# Patient Record
Sex: FEMALE | Race: ASIAN | NOT HISPANIC OR LATINO | Employment: OTHER | URBAN - METROPOLITAN AREA
[De-identification: names, ages, dates, MRNs, and addresses within clinical notes are randomized per-mention and may not be internally consistent; named-entity substitution may affect disease eponyms.]

---

## 2017-09-06 ENCOUNTER — OFFICE VISIT (OUTPATIENT)
Dept: FAMILY MEDICINE | Facility: CLINIC | Age: 62
End: 2017-09-06
Payer: MEDICAID

## 2017-09-06 VITALS
BODY MASS INDEX: 24.9 KG/M2 | OXYGEN SATURATION: 99 % | TEMPERATURE: 97.7 F | WEIGHT: 126.8 LBS | HEART RATE: 69 BPM | HEIGHT: 60 IN | DIASTOLIC BLOOD PRESSURE: 76 MMHG | SYSTOLIC BLOOD PRESSURE: 132 MMHG

## 2017-09-06 DIAGNOSIS — Z00.00 NORMAL ROUTINE HISTORY AND PHYSICAL EXAMINATION: Primary | ICD-10-CM

## 2017-09-06 DIAGNOSIS — Z13.6 CARDIOVASCULAR SCREENING; LDL GOAL LESS THAN 160: ICD-10-CM

## 2017-09-06 DIAGNOSIS — Z01.00 VISIT FOR EYE AND VISION EXAM: ICD-10-CM

## 2017-09-06 DIAGNOSIS — Z11.3 SCREEN FOR STD (SEXUALLY TRANSMITTED DISEASE): ICD-10-CM

## 2017-09-06 DIAGNOSIS — Z12.31 VISIT FOR SCREENING MAMMOGRAM: ICD-10-CM

## 2017-09-06 DIAGNOSIS — Z12.4 SCREENING FOR MALIGNANT NEOPLASM OF CERVIX: ICD-10-CM

## 2017-09-06 DIAGNOSIS — Z11.59 NEED FOR HEPATITIS C SCREENING TEST: ICD-10-CM

## 2017-09-06 DIAGNOSIS — Z23 NEED FOR PROPHYLACTIC VACCINATION AND INOCULATION AGAINST INFLUENZA: ICD-10-CM

## 2017-09-06 LAB
ALBUMIN SERPL-MCNC: 4 G/DL (ref 3.4–5)
ALP SERPL-CCNC: 80 U/L (ref 40–150)
ALT SERPL W P-5'-P-CCNC: 24 U/L (ref 0–50)
AST SERPL W P-5'-P-CCNC: 18 U/L (ref 0–45)
BILIRUB DIRECT SERPL-MCNC: 0.1 MG/DL (ref 0–0.2)
BILIRUB SERPL-MCNC: 0.6 MG/DL (ref 0.2–1.3)
GLUCOSE SERPL-MCNC: 107 MG/DL (ref 70–99)
LDLC SERPL DIRECT ASSAY-MCNC: 114 MG/DL
PROT SERPL-MCNC: 8.4 G/DL (ref 6.8–8.8)
SPECIMEN SOURCE: NORMAL
WET PREP SPEC: NORMAL

## 2017-09-06 PROCEDURE — 87389 HIV-1 AG W/HIV-1&-2 AB AG IA: CPT | Performed by: NURSE PRACTITIONER

## 2017-09-06 PROCEDURE — 82947 ASSAY GLUCOSE BLOOD QUANT: CPT | Performed by: NURSE PRACTITIONER

## 2017-09-06 PROCEDURE — G0476 HPV COMBO ASSAY CA SCREEN: HCPCS | Performed by: NURSE PRACTITIONER

## 2017-09-06 PROCEDURE — 36415 COLL VENOUS BLD VENIPUNCTURE: CPT | Performed by: NURSE PRACTITIONER

## 2017-09-06 PROCEDURE — T1013 SIGN LANG/ORAL INTERPRETER: HCPCS | Mod: U3 | Performed by: NURSE PRACTITIONER

## 2017-09-06 PROCEDURE — 87591 N.GONORRHOEAE DNA AMP PROB: CPT | Performed by: NURSE PRACTITIONER

## 2017-09-06 PROCEDURE — 80076 HEPATIC FUNCTION PANEL: CPT | Performed by: NURSE PRACTITIONER

## 2017-09-06 PROCEDURE — G0145 SCR C/V CYTO,THINLAYER,RESCR: HCPCS | Performed by: NURSE PRACTITIONER

## 2017-09-06 PROCEDURE — 86780 TREPONEMA PALLIDUM: CPT | Performed by: NURSE PRACTITIONER

## 2017-09-06 PROCEDURE — G0472 HEP C SCREEN HIGH RISK/OTHER: HCPCS | Performed by: NURSE PRACTITIONER

## 2017-09-06 PROCEDURE — 90686 IIV4 VACC NO PRSV 0.5 ML IM: CPT | Performed by: NURSE PRACTITIONER

## 2017-09-06 PROCEDURE — 83721 ASSAY OF BLOOD LIPOPROTEIN: CPT | Performed by: NURSE PRACTITIONER

## 2017-09-06 PROCEDURE — 87210 SMEAR WET MOUNT SALINE/INK: CPT | Performed by: NURSE PRACTITIONER

## 2017-09-06 PROCEDURE — 86706 HEP B SURFACE ANTIBODY: CPT | Performed by: NURSE PRACTITIONER

## 2017-09-06 PROCEDURE — 99396 PREV VISIT EST AGE 40-64: CPT | Mod: 25 | Performed by: NURSE PRACTITIONER

## 2017-09-06 PROCEDURE — 90471 IMMUNIZATION ADMIN: CPT | Performed by: NURSE PRACTITIONER

## 2017-09-06 PROCEDURE — 87491 CHLMYD TRACH DNA AMP PROBE: CPT | Performed by: NURSE PRACTITIONER

## 2017-09-06 NOTE — PATIENT INSTRUCTIONS
Based on your medical history and these are the current health maintenance or preventive care services that you are due for (some may have been done at this visit)  Health Maintenance Due   Topic Date Due     TETANUS IMMUNIZATION (SYSTEM ASSIGNED)  04/02/1974     HEPATITIS C SCREENING  04/02/1974     PAP SCREENING Q3 YR (SYSTEM ASSIGNED)  04/02/1977     LIPID SCREEN Q5 YR FEMALE (SYSTEM ASSIGNED)  04/02/2001     ADVANCE DIRECTIVE PLANNING Q5 YRS  04/02/2011     MAMMO SCREEN Q2 YR (SYSTEM ASSIGNED)  06/01/2015     EYE EXAM Q1 YEAR  07/14/2015     INFLUENZA VACCINE (SYSTEM ASSIGNED)  09/01/2017         At [facility], we strive to deliver an exceptional experience to you, every time we see you.    If you receive a survey in the mail, please send us back your thoughts. We really do value your feedback.    Your care team's suggested websites for health information:  Www.RENTISH.[employer] : Up to date and easily searchable information on multiple topics.  Www.medlineplus.gov : medication info, interactive tutorials, watch real surgeries online  Www.familydoctor.org : good info from the Academy of Family Physicians  Www.cdc.gov : public health info, travel advisories, epidemics (H1N1)  Www.aap.org : children's health info, normal development, vaccinations  Www.health.[place] Health.[place].us : [place] dept of health, public health issues in [place], N1N1    How to contact your care team:   Team Steph/Juventino [phone]         Pharmacy [phone]    Dr. Cárdenas, Mariely Tafoya PA-C, Dr. Burden, Cherise WADR CNP, Fern Vu PA-C, Dr. Wahl, and SYLVIA Gomes CNP    Team RNs: Dariela & Akiko      Clinic hours  M-Th 7 am-7 pm   Fri 7 am-5 pm.   Urgent care M-F 11 am-9 pm,   Sat/Sun 9 am-5 pm.  Pharmacy M-Th 8 am-8 pm Fri 8 am-6 pm  Sat/Sun 9 am-5 pm.     All password changes, disabled accounts, or ID changes in [employer]/MyHealth will be done by our Access Services Department.    If you need help with  your account or password, call: [phone]. Clinic staff no longer has the ability to change passwords.     Preventive Health Recommendations  Female Ages 50 - 64    Yearly exam: See your health care provider every year in order to  o Review health changes.   o Discuss preventive care.    o Review your medicines if your doctor has prescribed any.      Get a Pap test every three years (unless you have an abnormal result and your provider advises testing more often).    If you get Pap tests with HPV test, you only need to test every 5 years, unless you have an abnormal result.     You do not need a Pap test if your uterus was removed (hysterectomy) and you have not had cancer.    You should be tested each year for STDs (sexually transmitted diseases) if you're at risk.     Have a mammogram every 1 to 2 years.    Have a colonoscopy at age 50, or have a yearly FIT test (stool test). These exams screen for colon cancer.      Have a cholesterol test every 5 years, or more often if advised.    Have a diabetes test (fasting glucose) every three years. If you are at risk for diabetes, you should have this test more often.     If you are at risk for osteoporosis (brittle bone disease), think about having a bone density scan (DEXA).    Shots: Get a flu shot each year. Get a tetanus shot every 10 years.    Nutrition:     Eat at least 5 servings of fruits and vegetables each day.    Eat whole-grain bread, whole-wheat pasta and brown rice instead of white grains and rice.    Talk to your provider about Calcium and Vitamin D.     Lifestyle    Exercise at least 150 minutes a week (30 minutes a day, 5 days a week). This will help you control your weight and prevent disease.    Limit alcohol to one drink per day.    No smoking.     Wear sunscreen to prevent skin cancer.     See your dentist every six months for an exam and cleaning.    See your eye doctor every 1 to 2 years.

## 2017-09-06 NOTE — LETTER
September 8, 2017      Mahad Milligan  [address]  [place] [address]            Dear Mahad,    Your STD testing is negative.  Continue to use condoms to help prevent sexually transmitted diseases.  Feel free to contact me with any questions or concerns.      Your blood sugar was slightly elevated at 107 but you were not fasting. Feel free to contact me with any questions or concerns.  Thank you for allowing me to participate in your care.    Heather Olsen APRN, CNP/ak        Results for orders placed or performed in visit on 09/06/17   Hepatitis C Screen Reflex to HCV RNA Quant and Genotype   Result Value Ref Range    Hepatitis C Antibody Nonreactive NR^Nonreactive   Pap imaged thin layer screen with HPV - recommended age 30 - 65 years (select HPV order below)   Result Value Ref Range    PAP NIL     Copath Report         Patient Name: MAHAD MILLIGAN  MR#: [id number]  Specimen #: [id number]  Collected: 9/6/2017  Received: 9/7/2017  Reported: 9/8/2017 14:45  Ordering Phy(s): HEATHER OLSEN    For improved result formatting, select 'View Enhanced Report Format'  under Linked Documents section.    SPECIMEN/STAIN PROCESS:  Pap imaged thin layer prep screening (Surepath, FocalPoint with guided  screening)       Pap-Cyto x 1, HPV ordered x 1    SOURCE: Cervical, endocervical  ----------------------------------------------------------------   Pap imaged thin layer prep screening (Surepath, FocalPoint with guided  screening)  SPECIMEN ADEQUACY:  Satisfactory for evaluation.  -Transitional zone component could not be determined due to atrophy.    CYTOLOGIC INTERPRETATION:    Negative for intraepithelial lesion or malignancy         Other Non-Neoplastic Findings:  -Inflammation present.    Electronically signed out by:  ROB Duggan (ASCP)    Processed and screened at [facility]    CLINICAL HISTORY:    Post Menopausal,    Papanicolaou Test  Limitations:  Cervical cytology is a screening test  with limited sensitivity; regular screening is critical for cancer  prevention; Pap tests are primarily effective for the  diagnosis/prevention of squamous cell carcinoma, not adenocarcinomas or  other cancers.    TESTING LAB LOCATION:  [address]  [phone]    COLLECTION SITE:  Client:  [facility]  Location: BK (B)     GLUCOSE   Result Value Ref Range    Glucose 107 (H) 70 - 99 mg/dL   LDL cholesterol direct   Result Value Ref Range    LDL Cholesterol Direct 114 (H) <100 mg/dL   Hepatic panel   Result Value Ref Range    Bilirubin Direct 0.1 0.0 - 0.2 mg/dL    Bilirubin Total 0.6 0.2 - 1.3 mg/dL    Albumin 4.0 3.4 - 5.0 g/dL    Protein Total 8.4 6.8 - 8.8 g/dL    Alkaline Phosphatase 80 40 - 150 U/L    ALT 24 0 - 50 U/L    AST 18 0 - 45 U/L   HIV Antigen Antibody Combo   Result Value Ref Range    HIV Antigen Antibody Combo Nonreactive NR^Nonreactive       Anti Treponema   Result Value Ref Range    Treponema pallidum Antibody Negative NEG^Negative   Hepatitis B Surface Antibody   Result Value Ref Range    Hepatitis B Surface Antibody 11.52 (H) <8.00 m[IU]/mL   Chlamydia trachomatis PCR   Result Value Ref Range    Specimen Description Cervix     Chlamydia Trachomatis PCR Negative NEG^Negative   Neisseria gonorrhoeae PCR   Result Value Ref Range    Specimen Descrip Cervix     N Gonorrhea PCR Negative NEG^Negative   Wet prep   Result Value Ref Range    Specimen Description Vagina     Wet Prep No yeast seen     Wet Prep No clue cells seen     Wet Prep No Trichomonas seen

## 2017-09-06 NOTE — PROGRESS NOTES
SUBJECTIVE:   CC: Mahad Jay is an 62 year old woman who presents for preventive health visit.     Healthy Habits:    Do you get at least three servings of calcium containing foods daily (dairy, green leafy vegetables, etc.)? yes    Amount of exercise or daily activities, outside of work: 2-3 day(s) per week    Problems taking medications regularly No    Medication side effects: No    Have you had an eye exam in the past two years? no    Do you see a dentist twice per year? no    Do you have sleep apnea, excessive snoring or daytime drowsiness?no          -------------------------------------    Today's PHQ-2 Score:   PHQ-2 ( 1999 Pfizer) 9/6/2017 5/8/2014   Q1: Little interest or pleasure in doing things 0 0   Q2: Feeling down, depressed or hopeless 0 0   PHQ-2 Score 0 0       Abuse: Current or Past(Physical, Sexual or Emotional)- No  Do you feel safe in your environment - Yes  Social History   Substance Use Topics     Smoking status: Never Smoker     Smokeless tobacco: Never Used     Alcohol use No     The patient does not drink >3 drinks per day nor >7 drinks per week.    Reviewed orders with patient.  Reviewed health maintenance and updated orders accordingly - Yes  Labs reviewed in EPIC  BP Readings from Last 3 Encounters:   09/06/17 132/76   05/08/14 124/77   05/01/14 142/76    Wt Readings from Last 3 Encounters:   09/06/17 126 lb 12.8 oz (57.5 kg)   05/08/14 123 lb 2 oz (55.8 kg)   05/01/14 122 lb 6 oz (55.5 kg)                  Patient Active Problem List   Diagnosis     Sensorineural hearing loss, asymmetrical     Tinnitus     CARDIOVASCULAR SCREENING; LDL GOAL LESS THAN 160     Esophageal reflux (GERD)     History reviewed. No pertinent surgical history.    Social History   Substance Use Topics     Smoking status: Never Smoker     Smokeless tobacco: Never Used     Alcohol use No     Family History   Problem Relation Age of Onset     CANCER No family hx of      DIABETES No family hx of       "Hypertension No family hx of      CEREBROVASCULAR DISEASE No family hx of      Thyroid Disease No family hx of      Glaucoma No family hx of      Macular Degeneration No family hx of                Patient over age 50, mutual decision to screen reflected in health maintenance.      Pertinent mammograms are reviewed under the imaging tab.  History of abnormal Pap smear: NO - age 30- 65 PAP every 3 years recommended    Reviewed and updated as needed this visit by clinical staffTobacco  Allergies  Meds  Med Hx  Surg Hx  Fam Hx  Soc Hx        Reviewed and updated as needed this visit by Provider        History reviewed. No pertinent past medical history.   History reviewed. No pertinent surgical history.    ROS:  C: NEGATIVE for fever, chills, change in weight  I: NEGATIVE for worrisome rashes, moles or lesions  E: NEGATIVE for vision changes or irritation  ENT: NEGATIVE for ear, mouth and throat problems  R: NEGATIVE for significant cough or SOB  B: NEGATIVE for masses, tenderness or discharge  CV: NEGATIVE for chest pain, palpitations or peripheral edema  GI: NEGATIVE for nausea, abdominal pain, heartburn, or change in bowel habits  : NEGATIVE for unusual urinary or vaginal symptoms. No vaginal bleeding.  M: NEGATIVE for significant arthralgias or myalgia  N: NEGATIVE for weakness, dizziness or paresthesias  E: NEGATIVE for temperature intolerance, skin/hair changes  P: NEGATIVE for changes in mood or affect     OBJECTIVE:   /76  Pulse 69  Temp 97.7  F (36.5  C) (Oral)  Ht 4' 11.53\" (1.512 m)  Wt 126 lb 12.8 oz (57.5 kg)  SpO2 99%  BMI 25.16 kg/m2  EXAM:  GENERAL: healthy, alert and no distress  EYES: Eyes grossly normal to inspection, PERRL and conjunctivae and sclerae normal  HENT: ear canals and TM's normal, nose and mouth without ulcers or lesions  NECK: no adenopathy, no asymmetry, masses, or scars and thyroid normal to palpation  RESP: lungs clear to auscultation - no rales, rhonchi or " wheezes  BREAST: normal without masses, tenderness or nipple discharge and no palpable axillary masses or adenopathy  CV: regular rate and rhythm, normal S1 S2, no S3 or S4, no murmur, click or rub, no peripheral edema and peripheral pulses strong  ABDOMEN: soft, nontender, no hepatosplenomegaly, no masses and bowel sounds normal   (female): normal female external genitalia, normal urethral meatus, vaginal mucosa pink, moist, well rugated, and normal cervix/adnexa/uterus without masses or discharge  RECTAL: normal sphincter tone, no rectal masses  MS: no gross musculoskeletal defects noted, no edema  SKIN: no suspicious lesions or rashes  NEURO: Normal strength and tone, mentation intact and speech normal  PSYCH: mentation appears normal, affect normal/bright  LYMPH: no cervical, supraclavicular, axillary, or inguinal adenopathy    ASSESSMENT/PLAN:   1. Normal routine history and physical examination    - GLUCOSE    2. Screening for malignant neoplasm of cervix    - Pap imaged thin layer screen with HPV - recommended age 30 - 65 years (select HPV order below)    3. CARDIOVASCULAR SCREENING; LDL GOAL LESS THAN 160    - LDL cholesterol direct    4. Visit for screening mammogram    - MA SCREENING DIGITAL BILAT - Future  (s+30); Future    5. Need for hepatitis C screening test    - Hepatitis C Screen Reflex to HCV RNA Quant and Genotype    6. Screen for STD (sexually transmitted disease)    - Chlamydia trachomatis PCR  - Neisseria gonorrhoeae PCR  - Wet prep  - Hepatic panel  - HIV Antigen Antibody Combo  - Anti Treponema  - Hepatitis B Surface Antibody    7. Need for prophylactic vaccination and inoculation against influenza        COUNSELING:   Reviewed preventive health counseling, as reflected in patient instructions       Regular exercise       Healthy diet/nutrition       Vision screening       Osteoporosis Prevention/Bone Health       Safe sex practices/STD prevention       Consider Hep C screening for patients  "born between 1944 and 1965      BP Screening:   Last 3 BP Readings:    BP Readings from Last 3 Encounters:   09/06/17 132/76   05/08/14 124/77   05/01/14 142/76       The following was recommended to the patient:  Re-screen BP within a year and recommended lifestyle modifications   reports that she has never smoked. She has never used smokeless tobacco.    Estimated body mass index is 25.16 kg/(m^2) as calculated from the following:    Height as of this encounter: 4' 11.53\" (1.512 m).    Weight as of this encounter: 126 lb 12.8 oz (57.5 kg).   Weight management plan: Discussed healthy diet and exercise guidelines and patient will follow up in 12 months in clinic to re-evaluate.    Counseling Resources:  ATP IV Guidelines  Pooled Cohorts Equation Calculator  Breast Cancer Risk Calculator  FRAX Risk Assessment  ICSI Preventive Guidelines  Dietary Guidelines for Americans, 2010  USDA's MyPlate  ASA Prophylaxis  Lung CA Screening    SYLVIA Brown CNP  [facility]  "

## 2017-09-06 NOTE — NURSING NOTE
"Chief Complaint   Patient presents with     Physical     Not fasting     Flu Shot       Initial /82 (BP Location: Left arm, Patient Position: Sitting, Cuff Size: Adult Regular)  Pulse 69  Temp 97.7  F (36.5  C) (Oral)  Ht 4' 11.53\" (1.512 m)  Wt 126 lb 12.8 oz (57.5 kg)  SpO2 99%  BMI 25.16 kg/m2 Estimated body mass index is 25.16 kg/(m^2) as calculated from the following:    Height as of this encounter: 4' 11.53\" (1.512 m).    Weight as of this encounter: 126 lb 12.8 oz (57.5 kg).  Medication Reconciliation: complete   Coco Marin MA      "

## 2017-09-06 NOTE — LETTER
September 14, 2017    Mahad Jay  [address]  [place] [address]    Dear Mahad,  We are happy to inform you that your PAP smear result from 9/6/17 is normal.  We are now able to do a follow up test on PAP smears. The DNA test is for HPV (Human Papilloma Virus). Cervical cancer is closely linked with certain types of HPV. Your result showed no evidence of high risk HPV.  Therefore we recommend you return in 3 years for your next pap smear.  You will still need to return to the clinic every year for an annual exam and other preventive tests.  Please contact the clinic at [phone] with any questions.  Sincerely,    SYLVIA Brown CNP/rlm

## 2017-09-06 NOTE — MR AVS SNAPSHOT
After Visit Summary   9/6/2017    Mahad Jay    MRN: [id number]           Patient Information     Date Of Birth          1955        Visit Information        Provider Department      9/6/2017 11:30 AM Lilia Olsen APRN CNP; [place] [employer] [facility]        Today's Diagnoses     Normal routine history and physical examination    -  1    Screening for malignant neoplasm of cervix        CARDIOVASCULAR SCREENING; LDL GOAL LESS THAN 160        Visit for screening mammogram        Need for hepatitis C screening test        Screen for STD (sexually transmitted disease)        Need for prophylactic vaccination and inoculation against influenza          Care Instructions    Based on your medical history and these are the current health maintenance or preventive care services that you are due for (some may have been done at this visit)  Health Maintenance Due   Topic Date Due     TETANUS IMMUNIZATION (SYSTEM ASSIGNED)  04/02/1974     HEPATITIS C SCREENING  04/02/1974     PAP SCREENING Q3 YR (SYSTEM ASSIGNED)  04/02/1977     LIPID SCREEN Q5 YR FEMALE (SYSTEM ASSIGNED)  04/02/2001     ADVANCE DIRECTIVE PLANNING Q5 YRS  04/02/2011     MAMMO SCREEN Q2 YR (SYSTEM ASSIGNED)  06/01/2015     EYE EXAM Q1 YEAR  07/14/2015     INFLUENZA VACCINE (SYSTEM ASSIGNED)  09/01/2017         At [facility], we strive to deliver an exceptional experience to you, every time we see you.    If you receive a survey in the mail, please send us back your thoughts. We really do value your feedback.    Your care team's suggested websites for health information:  Www.MobiliBuy.org : Up to date and easily searchable information on multiple topics.  Www.medlineplus.gov : medication info, interactive tutorials, watch real surgeries online  Www.familydoctor.org : good info from the Academy of Family Physicians  Www.cdc.gov : public health info, travel advisories, epidemics  (H1N1)  Www.aap.org : children's health info, normal development, vaccinations  Www.health.[facility].[place].us : [place] dept of health, public health issues in [place], N1N1    How to contact your care team:   Christina Choi/Juventino [phone]         Pharmacy [phone]    Dr. Cárdenas, Mariely Tafoya PA-C, Dr. Burden, Cherise Bazan APRN CNP, Fern Vu PA-C, Dr. Wahl, and SYLVIA Gomes CNP    Team RNs: Dariela & Akiko      Clinic hours  M-Th 7 am-7 pm   Fri 7 am-5 pm.   Urgent care M-F 11 am-9 pm,   Sat/Sun 9 am-5 pm.  Pharmacy M-Th 8 am-8 pm Fri 8 am-6 pm  Sat/Sun 9 am-5 pm.     All password changes, disabled accounts, or ID changes in [employer]/MyHealth will be done by our Access Services Department.    If you need help with your account or password, call: [phone]. Clinic staff no longer has the ability to change passwords.     Preventive Health Recommendations  Female Ages 50 - 64    Yearly exam: See your health care provider every year in order to  o Review health changes.   o Discuss preventive care.    o Review your medicines if your doctor has prescribed any.      Get a Pap test every three years (unless you have an abnormal result and your provider advises testing more often).    If you get Pap tests with HPV test, you only need to test every 5 years, unless you have an abnormal result.     You do not need a Pap test if your uterus was removed (hysterectomy) and you have not had cancer.    You should be tested each year for STDs (sexually transmitted diseases) if you're at risk.     Have a mammogram every 1 to 2 years.    Have a colonoscopy at age 50, or have a yearly FIT test (stool test). These exams screen for colon cancer.      Have a cholesterol test every 5 years, or more often if advised.    Have a diabetes test (fasting glucose) every three years. If you are at risk for diabetes, you should have this test more often.     If you are at risk for osteoporosis (brittle bone disease), think about  having a bone density scan (DEXA).    Shots: Get a flu shot each year. Get a tetanus shot every 10 years.    Nutrition:     Eat at least 5 servings of fruits and vegetables each day.    Eat whole-grain bread, whole-wheat pasta and brown rice instead of white grains and rice.    Talk to your provider about Calcium and Vitamin D.     Lifestyle    Exercise at least 150 minutes a week (30 minutes a day, 5 days a week). This will help you control your weight and prevent disease.    Limit alcohol to one drink per day.    No smoking.     Wear sunscreen to prevent skin cancer.     See your dentist every six months for an exam and cleaning.    See your eye doctor every 1 to 2 years.            Follow-ups after your visit        Your next 10 appointments already scheduled     Sep 15, 2017  4:00 PM CDT   MA SCREENING DIGITAL BILATERAL with [id number]   [facility]    [address] [place] [id number] [phone]           Do not use any powder, lotion or deodorant under your arms or on your breast. If you do, we will ask you to remove it before your exam.  Wear comfortable, two-piece clothing.  If you have any allergies, tell your care team.  Bring any previous mammograms from other facilities or have them mailed to the breast center.              Future tests that were ordered for you today     Open Future Orders        Priority Expected Expires Ordered    MA SCREENING DIGITAL BILAT - Future  (s+30) Routine  9/6/2018 9/6/2017            Who to contact     If you have questions or need follow up information about today's clinic visit or your schedule please contact [facility] directly at [phone].  Normal or non-critical lab and imaging results will be communicated to you by MyChart, letter or phone within 4 business days after the clinic has received the results. If you do not hear from us within 7 days, please contact the clinic  "through Honestly Now or phone. If you have a critical or abnormal lab result, we will notify you by phone as soon as possible.  Submit refill requests through Honestly Now or call your pharmacy and they will forward the refill request to us. Please allow 3 business days for your refill to be completed.          Additional Information About Your Visit        OpenROVharmyParcelDelivery Information     Honestly Now lets you send messages to your doctor, view your test results, renew your prescriptions, schedule appointments and more. To sign up, go to [email or web address] . Click on \"Log in\" on the left side of the screen, which will take you to the Welcome page. Then click on \"Sign up Now\" on the right side of the page.     You will be asked to enter the access code listed below, as well as some personal information. Please follow the directions to create your username and password.     Your access code is: [email or web address]  Expires: 2017 12:43 PM     Your access code will  in 90 days. If you need help or a new code, please call your [place] clinic or [phone].        Care EveryWhere ID     This is your Care EveryWhere ID. This could be used by other organizations to access your [place] medical records  [id number]        Your Vitals Were     Pulse Temperature Height Pulse Oximetry BMI (Body Mass Index)       69 97.7  F (36.5  C) (Oral) 4' 11.53\" (1.512 m) 99% 25.16 kg/m2        Blood Pressure from Last 3 Encounters:   17 132/76   14 124/77   14 142/76    Weight from Last 3 Encounters:   17 126 lb 12.8 oz (57.5 kg)   14 123 lb 2 oz (55.8 kg)   14 122 lb 6 oz (55.5 kg)              We Performed the Following     Anti Treponema     Chlamydia trachomatis PCR     GLUCOSE     Hepatic panel     Hepatitis B Surface Antibody     Hepatitis C Screen Reflex to HCV RNA Quant and Genotype     HIV Antigen Antibody Combo     LDL cholesterol direct     Neisseria gonorrhoeae PCR     Pap imaged thin layer screen with " HPV - recommended age 30 - 65 years (select HPV order below)     Wet prep        Primary Care Provider    None Specified       No primary provider on file.        Equal Access to Services     SARAH LOZANO : Hadii dane Matthews, saritha milligandesmondha, westontamiko loredorereelaine elainerachelelaine, clint vahein hayaaashly bowsercelena rambosheelaindio barton. So [facility] [place] [facility] [phone].    ATENCIÓN: Si habla español, tiene a lynn disposición servicios gratuitos de asistencia lingüística. Llame al [phone].    We comply with applicable federal civil rights laws and [place] laws. We do not discriminate on the basis of race, color, national origin, age, disability sex, sexual orientation or gender identity.            Thank you!     Thank you for choosing [facility]  for your care. Our goal is always to provide you with excellent care. Hearing back from our patients is one way we can continue to improve our services. Please take a few minutes to complete the written survey that you may receive in the mail after your visit with us. Thank you!             Your Updated Medication List - Protect others around you: Learn how to safely use, store and throw away your medicines at www.disposemymeds.org.          This list is accurate as of: 9/6/17 12:43 PM.  Always use your most recent med list.                   Brand Name Dispense Instructions for use Diagnosis    omeprazole 20 MG tablet     30 tablet    Take 1 tablet (20 mg) by mouth daily Take 30-60 minutes before a meal.    Esophageal reflux

## 2017-09-06 NOTE — PROGRESS NOTES
Injectable Influenza Immunization Documentation    1.  Is the person to be vaccinated sick today?  No    2. Does the person to be vaccinated have an allergy to eggs or to a component of the vaccine?  No    3. Has the person to be vaccinated today ever had a serious reaction to influenza vaccine in the past?  No    4. Has the person to be vaccinated ever had Guillain-[place] syndrome?  No     Form completed by EDMUNDO Manzo

## 2017-09-07 LAB
HBV SURFACE AB SERPL IA-ACNC: 11.52 M[IU]/ML
HCV AB SERPL QL IA: NONREACTIVE
HIV 1+2 AB+HIV1 P24 AG SERPL QL IA: NONREACTIVE
T PALLIDUM IGG+IGM SER QL: NEGATIVE

## 2017-09-08 LAB
C TRACH DNA SPEC QL NAA+PROBE: NEGATIVE
COPATH REPORT: NORMAL
N GONORRHOEA DNA SPEC QL NAA+PROBE: NEGATIVE
PAP: NORMAL
SPECIMEN SOURCE: NORMAL
SPECIMEN SOURCE: NORMAL

## 2017-09-11 LAB
FINAL DIAGNOSIS: NORMAL
HPV HR 12 DNA CVX QL NAA+PROBE: NEGATIVE
HPV16 DNA SPEC QL NAA+PROBE: NEGATIVE
HPV18 DNA SPEC QL NAA+PROBE: NEGATIVE
SPECIMEN DESCRIPTION: NORMAL

## 2017-10-03 ENCOUNTER — APPOINTMENT (OUTPATIENT)
Dept: OPTOMETRY | Facility: CLINIC | Age: 62
End: 2017-10-03
Payer: COMMERCIAL

## 2017-10-03 ENCOUNTER — OFFICE VISIT (OUTPATIENT)
Dept: OPTOMETRY | Facility: CLINIC | Age: 62
End: 2017-10-03
Payer: COMMERCIAL

## 2017-10-03 DIAGNOSIS — H52.01 HYPERMETROPIA OF RIGHT EYE: ICD-10-CM

## 2017-10-03 DIAGNOSIS — H10.13 ALLERGIC CONJUNCTIVITIS OF BOTH EYES: ICD-10-CM

## 2017-10-03 DIAGNOSIS — H25.13 NUCLEAR SCLEROSIS OF BOTH EYES: ICD-10-CM

## 2017-10-03 DIAGNOSIS — H52.4 PRESBYOPIA: Primary | ICD-10-CM

## 2017-10-03 PROCEDURE — 92004 COMPRE OPH EXAM NEW PT 1/>: CPT | Performed by: OPTOMETRIST

## 2017-10-03 PROCEDURE — 92340 FIT SPECTACLES MONOFOCAL: CPT | Performed by: OPTOMETRIST

## 2017-10-03 PROCEDURE — 92015 DETERMINE REFRACTIVE STATE: CPT | Performed by: OPTOMETRIST

## 2017-10-03 PROCEDURE — T1013 SIGN LANG/ORAL INTERPRETER: HCPCS | Mod: U3 | Performed by: OPTOMETRIST

## 2017-10-03 RX ORDER — AZELASTINE HYDROCHLORIDE 0.5 MG/ML
1 SOLUTION/ DROPS OPHTHALMIC 2 TIMES DAILY PRN
Qty: 1 BOTTLE | Refills: 6 | Status: SHIPPED | OUTPATIENT
Start: 2017-10-03 | End: 2019-07-17

## 2017-10-03 ASSESSMENT — VISUAL ACUITY
OD_SC: 20/120
OS_SC+: -2
OS_SC: 20/20
OD_SC: 20/30
OS_SC: 20/80-1
METHOD: SNELLEN - LINEAR

## 2017-10-03 ASSESSMENT — REFRACTION_MANIFEST
OS_SPHERE: PLANO
OS_CYLINDER: SPHERE
OD_ADD: +2.50
OD_CYLINDER: SPHERE
OS_ADD: +2.50
OD_SPHERE: +0.50

## 2017-10-03 ASSESSMENT — REFRACTION_WEARINGRX
OS_SPHERE: PLANO
OD_ADD: +2.25
OD_SPHERE: PLANO
OS_ADD: +2.25
SPECS_TYPE: BROKEN X 1 YEAR

## 2017-10-03 ASSESSMENT — CONF VISUAL FIELD
OS_NORMAL: 1
OD_NORMAL: 1

## 2017-10-03 ASSESSMENT — TONOMETRY
IOP_METHOD: TONOPEN
OS_IOP_MMHG: 14
OD_IOP_MMHG: 15

## 2017-10-03 ASSESSMENT — SLIT LAMP EXAM - LIDS
COMMENTS: 1+ DERMATOCHALASIS - UPPER LID
COMMENTS: 1+ DERMATOCHALASIS - UPPER LID

## 2017-10-03 ASSESSMENT — EXTERNAL EXAM - LEFT EYE: OS_EXAM: NORMAL

## 2017-10-03 ASSESSMENT — EXTERNAL EXAM - RIGHT EYE: OD_EXAM: NORMAL

## 2017-10-03 NOTE — PATIENT INSTRUCTIONS
Recommend new glasses.  Your options are a bifocal which would allow you to see distance and near vision or separate glasses for reading.    Optivar- 1 drop both eyes 2 x day as needed for itchy eyes.    You have the start of mild cataracts.  You may notice some blurred vision or glare with night driving.  It is important that you wear good sunglasses to protect your eyes from the ultraviolet light from the sun.  I recommend that you return in 1 year for an eye exam unless there are any sudden changes in your vision.    Return in 1 year for a complete eye exam or sooner if needed.    Boyd Gill, GERMAN    The affects of the dilating drops last for 4- 6 hours.  You will be more sensitive to light and vision will be blurry up close.  Mydriatic sunglasses were given if needed.      Optometry Providers       Clinic Locations                                 Telephone Number   Dr. Jessy Gill   [place]   [place]    [facility] [place]  [phone]     [place] [facility] Hours:                [facility] [facility] Hours:       [facility] Hours:  [address]   [address]      [address] [place] [address]   [place] [address]    [place] [address]  Phone: [phone]                    Phone [phone]                      Phone: [phone]                          Monday 8:00-7:00                          Monday 8:00-7:00                          Monday 8:00-7:00              Tuesday 8:00-6:00                          Tuesday 8:00-7:00                          Tuesday 8:00-7:00              Wednesday 8:00-6:00                  Wednesday 8:00-7:00                   Wednesday 8:00-7:00      Thursday 8:00-6:00                        Thursday 8:00-7:00                         Thursday 8:00-7:00            Friday 8:00-5:00                              Friday 8:00-5:00                              Friday  8:00-5:00    Please log on to [email or web address] to order your contact lenses.  The link is found on the Eye Care and Vision Services page.  As always, Thank you for trusting us with your health care needs!

## 2017-10-03 NOTE — PROGRESS NOTES
Chief Complaint   Patient presents with     COMPREHENSIVE EYE EXAM      Accompanied by   Last Eye Exam: 7-  Dilated Previously: Yes    What are you currently using to see?  Glasses broken x 1 year       Distance Vision Acuity: Satisfied with vision    Near Vision Acuity: Not satisfied     Eye Comfort: itchy when gardening  Do you use eye drops? : Yes: otc [employer] or [employer]  Occupation or Hobbies: none    Estefanía Bear Optometric Assistant, A.B.O.C.          Medical, surgical and family histories reviewed and updated 10/3/2017.       OBJECTIVE: See Ophthalmology exam    ASSESSMENT:    ICD-10-CM    1. Presbyopia H52.4 REFRACTION   2. Hypermetropia of right eye H52.01 REFRACTION   3. Allergic conjunctivitis of both eyes H10.13 azelastine (OPTIVAR) 0.05 % SOLN ophthalmic solution     EYE EXAM (SIMPLE-NONBILLABLE)   4. Nuclear sclerosis of both eyes H25.13 EYE EXAM (SIMPLE-NONBILLABLE)      PLAN:     Patient Instructions   Recommend new glasses.  Your options are a bifocal which would allow you to see distance and near vision or separate glasses for reading.    Optivar- 1 drop both eyes 2 x day as needed for itchy eyes.    You have the start of mild cataracts.  You may notice some blurred vision or glare with night driving.  It is important that you wear good sunglasses to protect your eyes from the ultraviolet light from the sun.  I recommend that you return in 1 year for an eye exam unless there are any sudden changes in your vision.    Return in 1 year for a complete eye exam or sooner if needed.    Boyd Gill, OD

## 2017-10-03 NOTE — MR AVS SNAPSHOT
After Visit Summary   10/3/2017    Mahad Jay    MRN: [id number]           Patient Information     Date Of Birth          1955        Visit Information        Provider Department      10/3/2017 9:30 AM Boyd Gill, OD; [employer] [facility]        Today's Diagnoses     Presbyopia    -  1    Hypermetropia of right eye        Allergic conjunctivitis of both eyes        Nuclear sclerosis of both eyes          Care Instructions    Recommend new glasses.  Your options are a bifocal which would allow you to see distance and near vision or separate glasses for reading.    Optivar- 1 drop both eyes 2 x day as needed for itchy eyes.    You have the start of mild cataracts.  You may notice some blurred vision or glare with night driving.  It is important that you wear good sunglasses to protect your eyes from the ultraviolet light from the sun.  I recommend that you return in 1 year for an eye exam unless there are any sudden changes in your vision.    Return in 1 year for a complete eye exam or sooner if needed.    Boyd Gill, OD    The affects of the dilating drops last for 4- 6 hours.  You will be more sensitive to light and vision will be blurry up close.  Mydriatic sunglasses were given if needed.      Optometry Providers       Clinic Locations                                 Telephone Number   Dr. Jessy Gill   [facility]  [phone]     [place] [facility] Hours:                [place] [facility] Hours:       [place] [facility] Hours:  [address]   [address]     [address] [place] [address]   [place] [address]    [place] [address]  Phone: [phone]                    Phone [phone]                      Phone: [phone]                          Monday 8:00-7:00                          Monday 8:00-7:00          "                 Monday 8:00-7:00              Tuesday 8:00-6:00                          Tuesday 8:00-7:00                          Tuesday 8:00-7:00              Wednesday 8:00-6:00                  Wednesday 8:00-7:00                   Wednesday 8:00-7:00      Thursday 8:00-6:00                        Thursday 8:00-7:00                         Thursday 8:00-7:00            Friday 8:00-5:00                              Friday 8:00-5:00                              Friday 8:00-5:00    Please log on to [email or web address] to order your contact lenses.  The link is found on the Eye Care and Vision Services page.  As always, Thank you for trusting us with your health care needs!                Follow-ups after your visit        Follow-up notes from your care team     Return in about 1 year (around 10/3/2018) for Annual Visit.      Who to contact     If you have questions or need follow up information about today's clinic visit or your schedule please contact [facility] directly at [phone].  Normal or non-critical lab and imaging results will be communicated to you by Carmudihart, letter or phone within 4 business days after the clinic has received the results. If you do not hear from us within 7 days, please contact the clinic through Carmudihart or phone. If you have a critical or abnormal lab result, we will notify you by phone as soon as possible.  Submit refill requests through SocialPandas or call your pharmacy and they will forward the refill request to us. Please allow 3 business days for your refill to be completed.          Additional Information About Your Visit        SocialPandas Information     SocialPandas lets you send messages to your doctor, view your test results, renew your prescriptions, schedule appointments and more. To sign up, go to [email or web address] . Click on \"Log in\" on the left side of the screen, which will take you to the Welcome page. Then click on \"Sign up Now\" on the right side of " the page.     You will be asked to enter the access code listed below, as well as some personal information. Please follow the directions to create your username and password.     Your access code is: [email or web address]  Expires: 2017 12:43 PM     Your access code will  in 90 days. If you need help or a new code, please call your [place] clinic or [phone].        Care EveryWhere ID     This is your Care EveryWhere ID. This could be used by other organizations to access your [place] medical records  [id number]         Blood Pressure from Last 3 Encounters:   17 132/76   14 124/77   14 142/76    Weight from Last 3 Encounters:   17 57.5 kg (126 lb 12.8 oz)   14 55.8 kg (123 lb 2 oz)   14 55.5 kg (122 lb 6 oz)              We Performed the Following     EYE EXAM (SIMPLE-NONBILLABLE)     REFRACTION          Today's Medication Changes          These changes are accurate as of: 10/3/17 11:16 AM.  If you have any questions, ask your nurse or doctor.               Start taking these medicines.        Dose/Directions    azelastine 0.05 % Soln ophthalmic solution   Commonly known as:  OPTIVAR   Used for:  Allergic conjunctivitis of both eyes   Started by:  Boyd Gill, OD        Dose:  1 drop   Place 1 drop into both eyes 2 times daily as needed   Quantity:  1 Bottle   Refills:  6            Where to get your medicines      [place] [facility] [place] [address]  [address], [facility] [address]     Phone:  [phone]     azelastine 0.05 % Soln ophthalmic solution                Primary Care Provider    None Specified       No primary provider on file.        Equal Access to Services     SARAH LOZANO : Parker Matthews, saritha vivar, qabienvenido kaalmaelaine lopez, clint barton. So [facility] [phone].    ATENCIÓN: Si habla español, tiene a lynn disposición servicios  clare de asistencia lingüística. Joey frye [phone].    We comply with applicable federal civil rights laws and [place] laws. We do not discriminate on the basis of race, color, national origin, age, disability, sex, sexual orientation, or gender identity.            Thank you!     Thank you for choosing [facility]  for your care. Our goal is always to provide you with excellent care. Hearing back from our patients is one way we can continue to improve our services. Please take a few minutes to complete the written survey that you may receive in the mail after your visit with us. Thank you!             Your Updated Medication List - Protect others around you: Learn how to safely use, store and throw away your medicines at www.disposemymeds.org.          This list is accurate as of: 10/3/17 11:16 AM.  Always use your most recent med list.                   Brand Name Dispense Instructions for use Diagnosis    azelastine 0.05 % Soln ophthalmic solution    OPTIVAR    1 Bottle    Place 1 drop into both eyes 2 times daily as needed    Allergic conjunctivitis of both eyes

## 2019-07-17 ENCOUNTER — ANCILLARY PROCEDURE (OUTPATIENT)
Dept: GENERAL RADIOLOGY | Facility: CLINIC | Age: 64
End: 2019-07-17
Attending: NURSE PRACTITIONER
Payer: COMMERCIAL

## 2019-07-17 ENCOUNTER — OFFICE VISIT (OUTPATIENT)
Dept: FAMILY MEDICINE | Facility: CLINIC | Age: 64
End: 2019-07-17
Payer: COMMERCIAL

## 2019-07-17 VITALS
RESPIRATION RATE: 12 BRPM | HEART RATE: 72 BPM | HEIGHT: 59 IN | DIASTOLIC BLOOD PRESSURE: 84 MMHG | BODY MASS INDEX: 26.21 KG/M2 | TEMPERATURE: 98 F | WEIGHT: 130 LBS | SYSTOLIC BLOOD PRESSURE: 129 MMHG | OXYGEN SATURATION: 96 %

## 2019-07-17 DIAGNOSIS — M54.41 CHRONIC BILATERAL LOW BACK PAIN WITH BILATERAL SCIATICA: ICD-10-CM

## 2019-07-17 DIAGNOSIS — M54.41 CHRONIC BILATERAL LOW BACK PAIN WITH BILATERAL SCIATICA: Primary | ICD-10-CM

## 2019-07-17 DIAGNOSIS — M54.42 CHRONIC BILATERAL LOW BACK PAIN WITH BILATERAL SCIATICA: Primary | ICD-10-CM

## 2019-07-17 DIAGNOSIS — G89.29 CHRONIC BILATERAL LOW BACK PAIN WITH BILATERAL SCIATICA: ICD-10-CM

## 2019-07-17 DIAGNOSIS — G89.29 CHRONIC BILATERAL LOW BACK PAIN WITH BILATERAL SCIATICA: Primary | ICD-10-CM

## 2019-07-17 DIAGNOSIS — Z12.31 ENCOUNTER FOR SCREENING MAMMOGRAM FOR BREAST CANCER: ICD-10-CM

## 2019-07-17 DIAGNOSIS — M54.42 CHRONIC BILATERAL LOW BACK PAIN WITH BILATERAL SCIATICA: ICD-10-CM

## 2019-07-17 PROCEDURE — 99214 OFFICE O/P EST MOD 30 MIN: CPT | Performed by: NURSE PRACTITIONER

## 2019-07-17 PROCEDURE — 72100 X-RAY EXAM L-S SPINE 2/3 VWS: CPT

## 2019-07-17 ASSESSMENT — PAIN SCALES - GENERAL: PAINLEVEL: SEVERE PAIN (7)

## 2019-07-17 ASSESSMENT — MIFFLIN-ST. JEOR: SCORE: 1050.31

## 2019-07-17 NOTE — PATIENT INSTRUCTIONS
X-ray today to look for fracture from fall  Schedule MRI (for chronic pain)  Tylenol or ibuprofen as needed for pain  Ice or heat 15 minutes 4-6 times daily  Gentle stretching  If you develop loss of bowel or bladder or numbness in the groin or thighs go to emergency department  Further plan pending results of MRI        At [facility], we strive to deliver an exceptional experience to you, every time we see you.  If you receive a survey in the mail, please send us back your thoughts. We really do value your feedback.    Based on your medical history, these are the current health maintenance/preventive care services that you are due for (some may have been done at this visit.)  Health Maintenance Due   Topic Date Due     ADVANCE CARE PLANNING  1955     DTAP/TDAP/TD IMMUNIZATION (1 - Tdap) 04/02/1981     LIPID  04/02/2001     ZOSTER IMMUNIZATION (1 of 2) 04/02/2006     MAMMO SCREENING  06/01/2015     PREVENTIVE CARE VISIT  09/06/2018     EYE EXAM  10/03/2018     PHQ-2  01/01/2019         Suggested websites for health information:  Www.Invoiceable.Firmafon : Up to date and easily searchable information on multiple topics.  Www.medlineplus.gov : medication info, interactive tutorials, watch real surgeries online  Www.familydoctor.org : good info from the Academy of Family Physicians  Www.cdc.gov : public health info, travel advisories, epidemics (H1N1)  Www.aap.org : children's health info, normal development, vaccinations  Www.health.[facility].[place].us : [place] dept of health, public health issues in [place], N1N1    Your care team:                            Family Medicine Internal Medicine   MD Harish Arzate MD Shantel Branch-Fleming, MD Katya Georgiev PA-C Nam Ho, MD Pediatrics   FELIPE Barajas, MD Maryann Silveira CNP, MD Deborah Mielke, MD Kim Thein, APRN CNP      Clinic hours: Monday - Thursday 7 am-7 pm; Fridays 7 am-5  pm.   Urgent care: Monday - Friday 11 am-9 pm; Saturday and Sunday 9 am-5 pm.  Pharmacy : Monday -Thursday 8 am-8 pm; Friday 8 am-6 pm; Saturday and Sunday 9 am-5 pm.     Clinic: [phone]   Pharmacy: [phone]

## 2019-07-17 NOTE — PROGRESS NOTES
Subjective     Mahad Jay is a 64 year old female who presents to clinic today for the following health issues:    HPI   Back Pain       Duration: ongoing for years, since 2002. worsening        Specific cause: lifting    Description:   Location of pain: low back bilateral  Character of pain: tightness  Pain radiation:radiates into the right buttocks and radiates into the left buttocks and both thighs, tingling, shocking on and off  New numbness or weakness in legs, not attributed to pain:  YES    Intensity: Currently 7/10, At its worst 9/10    History:   Pain interferes with job: Not applicable  History of back problems: no prior back problems  Any previous MRI or X-rays: None  Sees a specialist for back pain:  No  Therapies tried without relief: tylenol    Alleviating factors:   Improved by: advil 600mg - helps the pain    Precipitating factors:  Worsened by: Lifting, Standing and Sitting          Accompanying Signs & Symptoms:  Risk of Fracture:  None  Risk of Cauda Equina:  None  Risk of Infection:  None  Risk of Cancer:  None  Risk of Ankylosing Spondylitis:  Onset at age <35, male, AND morning back stiffness. no           Low back pain with radiation to knees  No loss of bowel or bladder  Numbness/tingling  Pain that shoots down leg x1 year   Fell last year mopping the floor, slipped on water    She also describes some low abdominal pain that sounds like back pain that radiates through abdomen but difficult to tell by how she describes. Denies loss of bowel or bladder or changes in bowel or urinary habits.     present for visit.      Patient Active Problem List   Diagnosis     Sensorineural hearing loss, asymmetrical     Tinnitus     CARDIOVASCULAR SCREENING; LDL GOAL LESS THAN 160     Esophageal reflux (GERD)     Cervical cancer screening     Chronic bilateral low back pain with bilateral sciatica     History reviewed. No pertinent surgical history.    Social History     Tobacco Use     Smoking  "status: Never Smoker     Smokeless tobacco: Never Used   Substance Use Topics     Alcohol use: No     Family History   Problem Relation Age of Onset     Cancer No family hx of      Diabetes No family hx of      Hypertension No family hx of      Cerebrovascular Disease No family hx of      Thyroid Disease No family hx of      Glaucoma No family hx of      Macular Degeneration No family hx of          No current outpatient medications on file.     No Known Allergies  BP Readings from Last 3 Encounters:   07/17/19 129/84   09/06/17 132/76   05/08/14 124/77    Wt Readings from Last 3 Encounters:   07/17/19 59 kg (130 lb)   09/06/17 57.5 kg (126 lb 12.8 oz)   05/08/14 55.8 kg (123 lb 2 oz)                  Reviewed and updated as needed this visit by Provider  Tobacco  Allergies  Meds  Problems  Med Hx  Surg Hx  Fam Hx         Review of Systems   ROS COMP: Constitutional, HEENT, cardiovascular, pulmonary, gi and gu systems are negative, except as otherwise noted.      Objective    /84 (BP Location: Right arm, Patient Position: Chair, Cuff Size: Adult Regular)   Pulse 72   Temp 98  F (36.7  C) (Oral)   Resp 12   Ht 1.499 m (4' 11\")   Wt 59 kg (130 lb)   SpO2 96%   BMI 26.26 kg/m    Body mass index is 26.26 kg/m .  Physical Exam   GENERAL: healthy, alert and no distress  NECK: no adenopathy, no asymmetry, masses, or scars and thyroid normal to palpation  RESP: lungs clear to auscultation - no rales, rhonchi or wheezes  CV: regular rate and rhythm, normal S1 S2, no S3 or S4, no murmur, click or rub, no peripheral edema and peripheral pulses strong  ABDOMEN: soft, nontender, no hepatosplenomegaly, no masses and bowel sounds normal  MS: no gross musculoskeletal defects noted, no edema  NEURO: Normal strength and tone, mentation intact and speech normal  Comprehensive back pain exam:  Tenderness of right paravertebral area, Pain limits the following motions: bending, twisting, Lower extremity strength " "functional and equal on both sides, Lower extremity reflexes within normal limits bilaterally, Lower extremity sensation normal and equal on both sides and Straight leg positive on  right, indicating possible ipsilateral radiculopathy  PSYCH: mentation appears normal, affect normal/bright    Diagnostic Test Results:  Xray -     LUMBAR SPINE TWO-THREE  VIEWS  7/17/2019 5:11 PM      HISTORY: fall last year, low back pain R>L wtih sciatica; Chronic  bilateral low back pain with bilateral sciatica; Chronic bilateral low  back pain with bilateral sciatica; Chronic bilateral low back pain  with bilateral sciatica     COMPARISON: None.     FINDINGS: There is normal osseous alignment.  No fractures are  identified.  There are degenerative changes in the facet joints at  L4-5 and L5-S1. There is grade 1 anterior spondylolisthesis of L4 on  L5 due to 2 the degenerative changes in the facet joints. Loss of disc  space height at L5-S1.                                                                      IMPRESSION: Degenerative change at L4-5 and L5-S1.     NATASHA CHEUNG MD        Assessment & Plan     1. Chronic bilateral low back pain with bilateral sciatica  Neuro intact and no red flags today  X-ray today to look for fracture from fall  Schedule MRI (for chronic pain)  Tylenol or ibuprofen as needed for pain  Ice or heat 15 minutes 4-6 times daily  Gentle stretching  If you develop loss of bowel or bladder or numbness in the groin or thighs go to emergency department  Further plan pending results of MRI  - XR Lumbar Spine 2/3 Views; Future  - MR Lumbar Spine w/o Contrast; Future    2. Encounter for screening mammogram for breast cancer  - MA SCREENING DIGITAL BILAT - Future  (s+30); Future     BMI:   Estimated body mass index is 26.26 kg/m  as calculated from the following:    Height as of this encounter: 1.499 m (4' 11\").    Weight as of this encounter: 59 kg (130 lb).           See Patient Instructions    Return in about 1 " week (around 7/24/2019), or if symptoms worsen or fail to improve.     The benefits, risks and potential side effects were discussed in detail. Black box warnings discussed as relevant. All patient questions were answered. The patient was instructed to follow up immediately if any adverse reactions develop.    Return precautions discussed, including when to seek urgent/emergent care.    Patient verbalizes understanding and agrees with plan of care. Patient stable for discharge.      SYLVIA Tavarez [facility]

## 2019-07-18 ENCOUNTER — TELEPHONE (OUTPATIENT)
Dept: FAMILY MEDICINE | Facility: CLINIC | Age: 64
End: 2019-07-18

## 2019-07-18 NOTE — TELEPHONE ENCOUNTER
Notes recorded by Felecia Mast, SYLVIA CNP on 7/17/2019 at 6:43 PM CDT  Please call patient and let her know x-ray shows degenerative changes in her lumbar spine likely contributing to her pain. I recommend getting the MRI to determine if anything else needs to be done as well.    This writer attempted to contact Mahad on 07/18/19 via "[employer]" .      Reason for call results and left message.      If patient calls back:   Registered Nurse called. Follow Triage Call workflow        JESUS PastranaN, RN, PHN

## 2019-07-19 NOTE — TELEPHONE ENCOUNTER
Patient called back and was informed of the response.    Dariela Fox RN, [facility] Triage

## 2019-07-19 NOTE — TELEPHONE ENCOUNTER
This writer attempted to contact patient on 07/19/19      Reason for call results and left message.      If patient calls back:   Registered Nurse called. Follow Triage Call workflow        Page Preciado RN

## 2019-07-19 NOTE — TELEPHONE ENCOUNTER
Reason for Call:  Other Results    Detailed comments: Pt returning phone call and would like a phone call back regarding results.    Phone Number Patient can be reached at: Home number on file [phone] (home)    Best Time: anytime    Can we leave a detailed message on this number? YES    Call taken on 7/19/2019 at 11:50 AM by Izaiah Mora

## 2019-08-23 ENCOUNTER — ANCILLARY PROCEDURE (OUTPATIENT)
Dept: MAMMOGRAPHY | Facility: CLINIC | Age: 64
End: 2019-08-23
Attending: NURSE PRACTITIONER
Payer: COMMERCIAL

## 2019-08-23 ENCOUNTER — ANCILLARY PROCEDURE (OUTPATIENT)
Dept: MRI IMAGING | Facility: CLINIC | Age: 64
End: 2019-08-23
Attending: NURSE PRACTITIONER
Payer: COMMERCIAL

## 2019-08-23 DIAGNOSIS — M54.41 CHRONIC BILATERAL LOW BACK PAIN WITH BILATERAL SCIATICA: ICD-10-CM

## 2019-08-23 DIAGNOSIS — Z12.31 ENCOUNTER FOR SCREENING MAMMOGRAM FOR BREAST CANCER: ICD-10-CM

## 2019-08-23 DIAGNOSIS — G89.29 CHRONIC BILATERAL LOW BACK PAIN WITH BILATERAL SCIATICA: ICD-10-CM

## 2019-08-23 DIAGNOSIS — M54.42 CHRONIC BILATERAL LOW BACK PAIN WITH BILATERAL SCIATICA: Primary | ICD-10-CM

## 2019-08-23 DIAGNOSIS — G89.29 CHRONIC BILATERAL LOW BACK PAIN WITH BILATERAL SCIATICA: Primary | ICD-10-CM

## 2019-08-23 DIAGNOSIS — M54.41 CHRONIC BILATERAL LOW BACK PAIN WITH BILATERAL SCIATICA: Primary | ICD-10-CM

## 2019-08-23 DIAGNOSIS — M54.42 CHRONIC BILATERAL LOW BACK PAIN WITH BILATERAL SCIATICA: ICD-10-CM

## 2019-08-23 PROCEDURE — 77067 SCR MAMMO BI INCL CAD: CPT | Performed by: RADIOLOGY

## 2019-08-23 PROCEDURE — 72148 MRI LUMBAR SPINE W/O DYE: CPT | Performed by: RADIOLOGY

## 2019-08-23 NOTE — RESULT ENCOUNTER NOTE
Please call patient and let her know MRI shows degenerative changes and canal narrowing which is likely contributing to her pain. I recommend follow up with neurosurgery to see other options (I.e. Injection or other) and physical therapy. Orders placed for both.

## 2019-08-26 ENCOUNTER — TELEPHONE (OUTPATIENT)
Dept: FAMILY MEDICINE | Facility: CLINIC | Age: 64
End: 2019-08-26

## 2019-08-26 NOTE — TELEPHONE ENCOUNTER
This writer attempted to contact Mahad on 08/26/19 via  #[phone]      Reason for call results and left message.      If patient calls back:   Registered Nurse called. Follow Triage Call workflow        Monica Campbell RN       Notes recorded by Felecia Mast APRN CNP on 8/23/2019 at 4:27 PM CDT  Please call patient and let her know MRI shows degenerative changes and canal narrowing which is likely contributing to her pain. I recommend follow up with neurosurgery to see other options (I.e. Injection or other) and physical therapy. Orders placed for both.

## 2019-08-27 NOTE — TELEPHONE ENCOUNTER
This writer attempted to contact Mahad on 08/27/19 via [employer]  #[phone]      Reason for call MRI results and left message.      If patient calls back:   Registered Nurse called. Follow Triage Call workflow          Steven Sam RN, BSN, PHN

## 2019-08-28 NOTE — TELEPHONE ENCOUNTER
Called and spoke with patient daughter, Hardy, she is main contact and phone number listed in chart. Okay was given to relay information to daughter.  Advised daughter of MRI results and provider recommendations. Scheduling line for PT and neurosurgery dept given. Daughter noted that PT had already contacted patient and apt set up for 9/12/19. Daughter will call today to set up neurosurgery consult.  No questions at this time from daughter.    Demetria Schmidt RN

## 2019-09-12 ENCOUNTER — THERAPY VISIT (OUTPATIENT)
Dept: PHYSICAL THERAPY | Facility: CLINIC | Age: 64
End: 2019-09-12
Attending: NURSE PRACTITIONER
Payer: COMMERCIAL

## 2019-09-12 DIAGNOSIS — M54.41 CHRONIC BILATERAL LOW BACK PAIN WITH BILATERAL SCIATICA: ICD-10-CM

## 2019-09-12 DIAGNOSIS — M54.42 CHRONIC BILATERAL LOW BACK PAIN WITH BILATERAL SCIATICA: ICD-10-CM

## 2019-09-12 DIAGNOSIS — G89.29 CHRONIC BILATERAL LOW BACK PAIN WITH BILATERAL SCIATICA: ICD-10-CM

## 2019-09-12 PROCEDURE — 97110 THERAPEUTIC EXERCISES: CPT | Mod: GP | Performed by: PHYSICAL THERAPIST

## 2019-09-12 PROCEDURE — 97112 NEUROMUSCULAR REEDUCATION: CPT | Mod: GP | Performed by: PHYSICAL THERAPIST

## 2019-09-12 PROCEDURE — 97161 PT EVAL LOW COMPLEX 20 MIN: CPT | Mod: GP | Performed by: PHYSICAL THERAPIST

## 2019-09-12 NOTE — LETTER
DEPARTMENT OF HEALTH AND HUMAN SERVICES  CENTERS FOR MEDICARE & MEDICAID SERVICES    PLAN/UPDATED PLAN OF PROGRESS FOR OUTPATIENT REHABILITATION    PATIENTS NAME:  Mahad Jay     : 1955    PROVIDER NUMBER:    [id number]    [id number]      PROVIDER NAME: [place] [facility] STEPHENIE QUILES    MEDICAL RECORD NUMBER: [id number]     START OF CARE DATE:  SOC Date: 19   TYPE:  PT    PRIMARY/TREATMENT DIAGNOSIS: (Pertinent Medical Diagnosis)  Chronic bilateral low back pain with bilateral sciatica    VISITS FROM START OF CARE:  Rxs Used: 1     [place] [facility] Initial Evaluation  Subjective:  The history is provided by the patient. A  was used.   Mahad Jay being seen for LBP.   Problem began 2019 (NP). Where condition occurred: at home.Problem occurred: CARRIED SOMETHING HEAVY.  10+ YEARS AGO   and reported as 6/10 on pain scale. General health as reported by patient is good. Pertinent medical history includes:  Menopausal and numbness/tingling.  Medical allergies: NONE.  Surgeries include:  None.  Current medications:  None.     Pain quality: DEEP PAIN. and is constant. Pain is the same all the time. Since onset symptoms are gradually worsening. Special tests:  MRI. Past treatment: NONE.    Patient is NONE.   Home/work barriers: HEAVY LIFTING.  Other pertinent conditions: STOMACH ULCER.  Type of problem:  Lumbar   Condition occurred with:  Lifting. This is a chronic condition    Patient reports pain:  Lumbar spine left and lumbar spine right. Radiates to:  Gluteals left, gluteals right, thigh right, lower leg right and lower leg left. Associated symptoms:  Loss of motion/stiffness, tingling and numbness. Symptoms are exacerbated by bending, sitting, lifting and walking (TRANSFERS: CAR. ) and relieved by activity/movement.    Objective:    [place] Lumbar Evaluation  Posture:  Sitting: poor  Correction of Posture: better  Movement Loss:  Flexion  (Flex): pain  Side [place] R (SG R): nil  Side Glide L (SG L): pain and nil  Test Movements:  PATIENTS NAME:  Mahad Jay   : 1955      FIS: During: increases    EIS: During: centralizing    Conclusion: derangement  Principle of Treatment:  Posture Correction: IN SITTING WITH TOWEL ROLL.   Extension: EIS  Other: NEUTRAL SPINE, THER EX, THER ACT, NMR, MANUAL THERAPY.     Assessment/Plan:    Patient is a 64 year old female with lumbar complaints.    Patient has the following significant findings with corresponding treatment plan.                Diagnosis 1:  CHRONIC BILAT. LOW BACK PAIN WITH BILATERAL SCIATICA.  Pain -  hot/cold therapy, US, electric stimulation, manual therapy, splint/taping/bracing/orthotics, self management, education, directional preference exercise and home program  Decreased ROM/flexibility - manual therapy, therapeutic exercise, therapeutic activity and home program  Decreased strength - therapeutic exercise, therapeutic activities and home program  Decreased function - therapeutic activities and home program  Impaired posture - neuro re-education,   therapeutic activities and home program  Therapy Evaluation Codes:   1) History comprised of:   Personal factors that impact the plan of care:      Past/current experiences.    Comorbidity factors that impact the plan of care are:      Numbness/tingling.     Medications impacting care: None.  2) Examination of Body Systems comprised of:   Body structures and functions that impact the plan of care:      LUMBAR SPINE.   Activity limitations that impact the plan of care are:      BEND, SIT, TRANSFERS.   3) Clinical presentation characteristics are:   Stable/Uncomplicated.  4) Decision-Making    Low complexity using standardized patient assessment instrument and/or measureable assessment of functional outcome.  Cumulative Therapy Evaluation is: Low complexity.  Previous and current functional limitations:  (See Goal Flow Sheet for this  "information)    Short term and Long term goals: (See Goal Flow Sheet for this information)   Communication ability:  Patient has an  for communication clarity.  Treatment Explanation - The following has been discussed with the patient:   RX ordered/plan of care  Anticipated outcomes  Possible risks and side effects  This patient would benefit from PT intervention to resume normal activities.   Rehab potential is good.      PATIENTS NAME:  Mahad Jay   : 1955        Frequency:  1 X week, once daily  Duration:  for 6 weeks  Discharge Plan:  Achieve all LTG.  Independent in home treatment program.  Reach maximal therapeutic benefit.2        Caregiver Signature/Credentials _____________________________ Date ________       Treating Provider: Logan Shelby PT/ATC   I have reviewed and certified the need for these services and plan of treatment while under my care.        PHYSICIAN'S SIGNATURE:   _________________________________________  Date___________   SYLVIA Spencer CNP    Certification period:  Beginning of Cert date period: 19 to  End of Cert period date: 19     Functional Level Progress Report: Please see attached \"Goal Flow sheet for Functional level.\"    ____X____ Continue Services or       ________ DC Services                Service dates: From  SOC Date: 19 date to present                         "

## 2019-09-12 NOTE — PROGRESS NOTES
[place] [facility] Initial Evaluation  Subjective:  The history is provided by the patient. A  was used.   Mahad Jay being seen for LBP.   Problem began 8/23/2019 (NP). Where condition occurred: at home.Problem occurred: CARRIED SOMETHING HEAVY.  10+ YEARS AGO   and reported as 6/10 on pain scale. General health as reported by patient is good. Pertinent medical history includes:  Menopausal and numbness/tingling.  Medical allergies: NONE.  Surgeries include:  None.  Current medications:  None.     Pain quality: DEEP PAIN. and is constant. Pain is the same all the time. Since onset symptoms are gradually worsening. Special tests:  MRI. Past treatment: NONE.    Patient is NONE.   Home/work barriers: HEAVY LIFTING.  Other pertinent conditions: STOMACH ULCER.  Type of problem:  Lumbar   Condition occurred with:  Lifting. This is a chronic condition    Patient reports pain:  Lumbar spine left and lumbar spine right. Radiates to:  Gluteals left, gluteals right, thigh right, lower leg right and lower leg left. Associated symptoms:  Loss of motion/stiffness, tingling and numbness. Symptoms are exacerbated by bending, sitting, lifting and walking (TRANSFERS: CAR. ) and relieved by activity/movement.                      Objective:  System    Physical Exam      [place] Lumbar Evaluation    Posture:  Sitting: poor        Correction of Posture: better    Movement Loss:  Flexion (Flex): pain    Side Glide R (SG R): nil  Side Glide L (SG L): pain and nil  Test Movements:  FIS: During: increases      EIS: During: centralizing                Conclusion: derangement  Principle of Treatment:  Posture Correction: IN SITTING WITH TOWEL ROLL.     Extension: EIS    Other: NEUTRAL SPINE, THER EX, THER ACT, NMR, MANUAL THERAPY.                                        ROS    Assessment/Plan:    Patient is a 64 year old female with lumbar complaints.    Patient has the following significant findings with  corresponding treatment plan.                Diagnosis 1:  CHRONIC BILAT. LOW BACK PAIN WITH BILATERAL SCIATICA.  Pain -  hot/cold therapy, US, electric stimulation, manual therapy, splint/taping/bracing/orthotics, self management, education, directional preference exercise and home program  Decreased ROM/flexibility - manual therapy, therapeutic exercise, therapeutic activity and home program  Decreased strength - therapeutic exercise, therapeutic activities and home program  Decreased function - therapeutic activities and home program  Impaired posture - neuro re-education,   therapeutic activities and home program      Therapy Evaluation Codes:   1) History comprised of:   Personal factors that impact the plan of care:      Past/current experiences.    Comorbidity factors that impact the plan of care are:      Numbness/tingling.     Medications impacting care: None.  2) Examination of Body Systems comprised of:   Body structures and functions that impact the plan of care:      LUMBAR SPINE.   Activity limitations that impact the plan of care are:      BEND, SIT, TRANSFERS.   3) Clinical presentation characteristics are:   Stable/Uncomplicated.  4) Decision-Making    Low complexity using standardized patient assessment instrument and/or measureable assessment of functional outcome.  Cumulative Therapy Evaluation is: Low complexity.    Previous and current functional limitations:  (See Goal Flow Sheet for this information)    Short term and Long term goals: (See Goal Flow Sheet for this information)     Communication ability:  Patient has an  for communication clarity.  Treatment Explanation - The following has been discussed with the patient:   RX ordered/plan of care  Anticipated outcomes  Possible risks and side effects  This patient would benefit from PT intervention to resume normal activities.   Rehab potential is good.    Frequency:  1 X week, once daily  Duration:  for 6 weeks  Discharge Plan:   Achieve all LTG.  Independent in home treatment program.  Reach maximal therapeutic benefit.2    Please refer to the daily flowsheet for treatment today, total treatment time and time spent performing 1:1 timed codes.

## 2019-09-17 ENCOUNTER — OFFICE VISIT (OUTPATIENT)
Dept: NEUROSURGERY | Facility: CLINIC | Age: 64
End: 2019-09-17
Attending: NURSE PRACTITIONER
Payer: COMMERCIAL

## 2019-09-17 VITALS
HEIGHT: 59 IN | TEMPERATURE: 98.5 F | OXYGEN SATURATION: 98 % | BODY MASS INDEX: 25.88 KG/M2 | WEIGHT: 128.4 LBS | DIASTOLIC BLOOD PRESSURE: 77 MMHG | SYSTOLIC BLOOD PRESSURE: 121 MMHG | HEART RATE: 68 BPM

## 2019-09-17 DIAGNOSIS — M54.16 LUMBAR RADICULOPATHY: Primary | ICD-10-CM

## 2019-09-17 PROCEDURE — 99203 OFFICE O/P NEW LOW 30 MIN: CPT | Performed by: NURSE PRACTITIONER

## 2019-09-17 ASSESSMENT — PAIN SCALES - GENERAL: PAINLEVEL: SEVERE PAIN (6)

## 2019-09-17 ASSESSMENT — MIFFLIN-ST. JEOR: SCORE: 1043.05

## 2019-09-17 NOTE — PROGRESS NOTES
"[facility]  Neurosurgery Clinic Visit      CC: low back and bilateral leg pain    Primary care Provider: [facility]      Reason For Visit:   I was asked by Felecia Mast CNP to consult on the patient for chronic bilateral low back pain with bilateral sciatica.      HPI: Mahad Jay is a 64 year old female with a 1 year history of chronic low back pain. She was seen today with  assistance. She denies injury at the onset of pain. She describes bilateral low back pain that radiates down bilateral anterior and posterior thighs to the knee. She reports intermittent radicular pain to the ankle. She describes the pain as \"electrical shocks.\" She states pain is worse with sitting, cleaning, vacuuming, bending, and washing clothes. She reports associated weakness. She denies paresthesias, bowel/bladder complaints and foot drop. She has tried OTC pain medications with some relief. She has been referred to PT, but has not had any injections or surgery.     Pain at its worst 8-9  Pain right now:  6    History reviewed. No pertinent past medical history.    Past Medical History reviewed with patient during visit.    History reviewed. No pertinent surgical history.  Past Surgical History reviewed with patient during visit.    No current outpatient medications on file.     No current facility-administered medications for this visit.        No Known Allergies    Social History     Socioeconomic History     Marital status:      Spouse name: None     Number of children: None     Years of education: None     Highest education level: None   Occupational History     None   Social Needs     Financial resource strain: None     Food insecurity:     Worry: None     Inability: None     Transportation needs:     Medical: None     Non-medical: None   Tobacco Use     Smoking status: Never Smoker     Smokeless tobacco: Never Used   Substance and Sexual Activity     Alcohol use: No     " Drug use: No     Sexual activity: Never     Partners: Male   Lifestyle     Physical activity:     Days per week: None     Minutes per session: None     Stress: None   Relationships     Social connections:     Talks on phone: None     Gets together: None     Attends Congregation service: None     Active member of club or organization: None     Attends meetings of clubs or organizations: None     Relationship status: None     Intimate partner violence:     Fear of current or ex partner: None     Emotionally abused: None     Physically abused: None     Forced sexual activity: None   Other Topics Concern     Parent/sibling w/ CABG, MI or angioplasty before 65F 55M? Not Asked   Social History Narrative     None       Family History   Problem Relation Age of Onset     Cancer No family hx of      Diabetes No family hx of      Hypertension No family hx of      Cerebrovascular Disease No family hx of      Thyroid Disease No family hx of      Glaucoma No family hx of      Macular Degeneration No family hx of          ROS: 10 point ROS neg other than the symptoms noted above in the HPI.    Vital Signs:       Examination:  Constitutional:  Alert, well nourished, NAD.  Memory: recent and remote memory   HEENT: Normocephalic, atraumatic.   Pulm:  Without shortness of breath   CV:  No pitting edema of BLE.    Neurological:  Awake  Alert  Oriented x 3  Speech clear  Motor exam    Hip Flexor:                Right: 5/5  Left:  5/5  Hip Adductor:             Right:  5/5  Left:  5/5  Hip Abductor:             Right:  5/5  Left:  5/5  Gastroc Soleus:        Right:  5/5  Left:  5/5  Tib/Ant:                      Right:  5/5  Left:  5/5  EHL:                          Right:  5/5  Left:  5/5   Sensation normal to bilateral upper and lower extremities  Muscle tone to bilateral upper and lower extremities   Gait: Able to stand from a seated position. Normal non-antalgic, non-myelopathic gait.  Able to heel/toe walk without loss of  "balance    Lumbar examination reveals no tenderness of the spine or paraspinous muscles.  Hip height is symmetrical. Negative SI joint, sciatic notch or greater trochanteric tenderness to palpation bilaterally.  Straight leg raise is negative bilaterally.      Imaging: Lumbar MRI 8/23/2019  Impression: Lumbar spondylosis at L3-L5 with the most significant findings at L4-L5 where there is moderate canal stenosis, bilateral lateral recess narrowing with impingement of descending L5 nerve  roots. In addition there is marrow edema along L4 and L5 bilateral pedicles and facet articular processes.     Assessment/Plan:    Mahad Jay is a 64 year old female with a 1 year history of chronic low back pain. She denies injury at the onset of pain. She describes bilateral low back pain that radiates down bilateral anterior and posterior thighs to the knee. She reports intermittent radicular pain to the ankle. She describes the pain as \"electrical shocks.\" She states pain is worse with sitting, cleaning, vacuuming, bending, and washing clothes. She reports associated weakness. She denies paresthesias, bowel/bladder complaints and foot drop. Discussed lumbar MRI in office. Recommended continuation of PT and LESI at this time. Follow up if pain persists. She verbalized understanding and agreement.    Patient Instructions   -Continue physical therapy as previously ordered.  -Lumbar injection ordered. Please call to schedule.  -Please contact the clinic if pain persists at [phone].      Jenni Mckenna, CNP  [facility]  [address] [address]    Tel [phone]  Pager [phone]    "

## 2019-09-17 NOTE — NURSING NOTE
"Mahad Jay is a 64 year old female who presents for:  Chief Complaint   Patient presents with     Neurologic Problem     LBP. Onset: 2002. Referred by Felecia Mast. MRI 8/23/19. Patient notes the pain goes across the entire low back and radiates down the anterior and posterior legs, like an electric charge. She will have N/T in the legs as well. When she sits for a period of time the right leg will swell. No tx.         Vitals:    Vitals:    09/17/19 1005   BP: 121/77   Pulse: 68   Temp: 98.5  F (36.9  C)   TempSrc: Oral   SpO2: 98%   Weight: 128 lb 6.4 oz (58.2 kg)   Height: 4' 11\" (1.499 m)       BMI:  Estimated body mass index is 25.93 kg/m  as calculated from the following:    Height as of this encounter: 4' 11\" (1.499 m).    Weight as of this encounter: 128 lb 6.4 oz (58.2 kg).    Pain Score:  Severe Pain (6)        Mariaa Fogn CMA      "

## 2019-09-17 NOTE — PATIENT INSTRUCTIONS
-Continue physical therapy as previously ordered.  -Lumbar injection ordered. Please call to schedule.  -Please contact the clinic if pain persists at [phone].

## 2019-09-17 NOTE — LETTER
"    9/17/2019         RE: Mahad Jay  [address]  [place] [address]        Dear Colleague,    Thank you for referring your patient, Mahad Jay, to the [facility]. Please see a copy of my visit note below.    [facility]  Neurosurgery Clinic Visit      CC: low back and bilateral leg pain    Primary care Provider: [facility], [facility]      Reason For Visit:   I was asked by Felecia Mast CNP to consult on the patient for chronic bilateral low back pain with bilateral sciatica.      HPI: Mahad Jay is a 64 year old female with a 1 year history of chronic low back pain. She was seen today with  assistance. She denies injury at the onset of pain. She describes bilateral low back pain that radiates down bilateral anterior and posterior thighs to the knee. She reports intermittent radicular pain to the ankle. She describes the pain as \"electrical shocks.\" She states pain is worse with sitting, cleaning, vacuuming, bending, and washing clothes. She reports associated weakness. She denies paresthesias, bowel/bladder complaints and foot drop. She has tried OTC pain medications with some relief. She has been referred to PT, but has not had any injections or surgery.     Pain at its worst 8-9  Pain right now:  6    History reviewed. No pertinent past medical history.    Past Medical History reviewed with patient during visit.    History reviewed. No pertinent surgical history.  Past Surgical History reviewed with patient during visit.    No current outpatient medications on file.     No current facility-administered medications for this visit.        No Known Allergies    Social History     Socioeconomic History     Marital status:      Spouse name: None     Number of children: None     Years of education: None     Highest education level: None   Occupational History     None   Social Needs     Financial resource strain: None     Food " insecurity:     Worry: None     Inability: None     Transportation needs:     Medical: None     Non-medical: None   Tobacco Use     Smoking status: Never Smoker     Smokeless tobacco: Never Used   Substance and Sexual Activity     Alcohol use: No     Drug use: No     Sexual activity: Never     Partners: Male   Lifestyle     Physical activity:     Days per week: None     Minutes per session: None     Stress: None   Relationships     Social connections:     Talks on phone: None     Gets together: None     Attends [employer] service: None     Active member of club or organization: None     Attends meetings of clubs or organizations: None     Relationship status: None     Intimate partner violence:     Fear of current or ex partner: None     Emotionally abused: None     Physically abused: None     Forced sexual activity: None   Other Topics Concern     Parent/sibling w/ CABG, MI or angioplasty before 65F 55M? Not Asked   Social History Narrative     None       Family History   Problem Relation Age of Onset     Cancer No family hx of      Diabetes No family hx of      Hypertension No family hx of      Cerebrovascular Disease No family hx of      Thyroid Disease No family hx of      Glaucoma No family hx of      Macular Degeneration No family hx of          ROS: 10 point ROS neg other than the symptoms noted above in the HPI.    Vital Signs:       Examination:  Constitutional:  Alert, well nourished, NAD.  Memory: recent and remote memory   HEENT: Normocephalic, atraumatic.   Pulm:  Without shortness of breath   CV:  No pitting edema of BLE.    Neurological:  Awake  Alert  Oriented x 3  Speech clear  Motor exam    Hip Flexor:                Right: 5/5  Left:  5/5  Hip Adductor:             Right:  5/5  Left:  5/5  Hip Abductor:             Right:  5/5  Left:  5/5  Gastroc Soleus:        Right:  5/5  Left:  5/5  Tib/Ant:                      Right:  5/5  Left:  5/5  EHL:                          Right:  5/5  Left:   "5/5   Sensation normal to bilateral upper and lower extremities  Muscle tone to bilateral upper and lower extremities   Gait: Able to stand from a seated position. Normal non-antalgic, non-myelopathic gait.  Able to heel/toe walk without loss of balance    Lumbar examination reveals no tenderness of the spine or paraspinous muscles.  Hip height is symmetrical. Negative SI joint, sciatic notch or greater trochanteric tenderness to palpation bilaterally.  Straight leg raise is negative bilaterally.      Imaging: Lumbar MRI 8/23/2019  Impression: Lumbar spondylosis at L3-L5 with the most significant findings at L4-L5 where there is moderate canal stenosis, bilateral lateral recess narrowing with impingement of descending L5 nerve  roots. In addition there is marrow edema along L4 and L5 bilateral pedicles and facet articular processes.     Assessment/Plan:    Mahad Jay is a 64 year old female with a 1 year history of chronic low back pain. She denies injury at the onset of pain. She describes bilateral low back pain that radiates down bilateral anterior and posterior thighs to the knee. She reports intermittent radicular pain to the ankle. She describes the pain as \"electrical shocks.\" She states pain is worse with sitting, cleaning, vacuuming, bending, and washing clothes. She reports associated weakness. She denies paresthesias, bowel/bladder complaints and foot drop. Discussed lumbar MRI in office. Recommended continuation of PT and LESI at this time. Follow up if pain persists. She verbalized understanding and agreement.    Patient Instructions   -Continue physical therapy as previously ordered.  -Lumbar injection ordered. Please call to schedule.  -Please contact the clinic if pain persists at [phone].      Jenni Mckenna CNP  [facility]  [address] [address]    Tel [phone]  Pager [phone]      Again, thank you for " allowing me to participate in the care of your patient.        Sincerely,        Jenni Mckenna NP

## 2019-09-20 ENCOUNTER — THERAPY VISIT (OUTPATIENT)
Dept: PHYSICAL THERAPY | Facility: CLINIC | Age: 64
End: 2019-09-20
Payer: COMMERCIAL

## 2019-09-20 DIAGNOSIS — G89.29 CHRONIC BILATERAL LOW BACK PAIN WITH BILATERAL SCIATICA: Primary | ICD-10-CM

## 2019-09-20 DIAGNOSIS — M54.41 CHRONIC BILATERAL LOW BACK PAIN WITH BILATERAL SCIATICA: Primary | ICD-10-CM

## 2019-09-20 DIAGNOSIS — M54.42 CHRONIC BILATERAL LOW BACK PAIN WITH BILATERAL SCIATICA: Primary | ICD-10-CM

## 2019-09-20 PROCEDURE — 97110 THERAPEUTIC EXERCISES: CPT | Mod: GP

## 2019-09-20 PROCEDURE — 97112 NEUROMUSCULAR REEDUCATION: CPT | Mod: GP

## 2019-09-24 ENCOUNTER — THERAPY VISIT (OUTPATIENT)
Dept: PHYSICAL THERAPY | Facility: CLINIC | Age: 64
End: 2019-09-24
Payer: COMMERCIAL

## 2019-09-24 DIAGNOSIS — M54.42 CHRONIC BILATERAL LOW BACK PAIN WITH BILATERAL SCIATICA: Primary | ICD-10-CM

## 2019-09-24 DIAGNOSIS — G89.29 CHRONIC BILATERAL LOW BACK PAIN WITH BILATERAL SCIATICA: Primary | ICD-10-CM

## 2019-09-24 DIAGNOSIS — M54.41 CHRONIC BILATERAL LOW BACK PAIN WITH BILATERAL SCIATICA: Primary | ICD-10-CM

## 2019-09-24 PROCEDURE — 97110 THERAPEUTIC EXERCISES: CPT | Mod: GP | Performed by: PHYSICAL THERAPIST

## 2019-09-24 PROCEDURE — 97112 NEUROMUSCULAR REEDUCATION: CPT | Mod: GP | Performed by: PHYSICAL THERAPIST

## 2019-09-24 PROCEDURE — 97035 APP MDLTY 1+ULTRASOUND EA 15: CPT | Mod: GP | Performed by: PHYSICAL THERAPIST

## 2019-09-24 PROCEDURE — 97140 MANUAL THERAPY 1/> REGIONS: CPT | Mod: GP | Performed by: PHYSICAL THERAPIST

## 2019-09-27 ENCOUNTER — THERAPY VISIT (OUTPATIENT)
Dept: PHYSICAL THERAPY | Facility: CLINIC | Age: 64
End: 2019-09-27
Payer: COMMERCIAL

## 2019-09-27 DIAGNOSIS — M54.42 CHRONIC BILATERAL LOW BACK PAIN WITH BILATERAL SCIATICA: Primary | ICD-10-CM

## 2019-09-27 DIAGNOSIS — G89.29 CHRONIC BILATERAL LOW BACK PAIN WITH BILATERAL SCIATICA: Primary | ICD-10-CM

## 2019-09-27 DIAGNOSIS — M54.41 CHRONIC BILATERAL LOW BACK PAIN WITH BILATERAL SCIATICA: Primary | ICD-10-CM

## 2019-09-27 PROCEDURE — 97112 NEUROMUSCULAR REEDUCATION: CPT | Mod: GP | Performed by: PHYSICAL THERAPIST

## 2019-09-27 PROCEDURE — 97140 MANUAL THERAPY 1/> REGIONS: CPT | Mod: GP | Performed by: PHYSICAL THERAPIST

## 2019-09-27 PROCEDURE — 97035 APP MDLTY 1+ULTRASOUND EA 15: CPT | Mod: GP | Performed by: PHYSICAL THERAPIST

## 2019-09-30 ENCOUNTER — THERAPY VISIT (OUTPATIENT)
Dept: PHYSICAL THERAPY | Facility: CLINIC | Age: 64
End: 2019-09-30
Payer: COMMERCIAL

## 2019-09-30 DIAGNOSIS — G89.29 CHRONIC BILATERAL LOW BACK PAIN WITH BILATERAL SCIATICA: Primary | ICD-10-CM

## 2019-09-30 DIAGNOSIS — M54.42 CHRONIC BILATERAL LOW BACK PAIN WITH BILATERAL SCIATICA: Primary | ICD-10-CM

## 2019-09-30 DIAGNOSIS — M54.41 CHRONIC BILATERAL LOW BACK PAIN WITH BILATERAL SCIATICA: Primary | ICD-10-CM

## 2019-09-30 PROCEDURE — 97140 MANUAL THERAPY 1/> REGIONS: CPT | Mod: GP

## 2019-09-30 PROCEDURE — 97035 APP MDLTY 1+ULTRASOUND EA 15: CPT | Mod: GP

## 2019-09-30 PROCEDURE — 97110 THERAPEUTIC EXERCISES: CPT | Mod: GP

## 2019-10-03 ENCOUNTER — THERAPY VISIT (OUTPATIENT)
Dept: PHYSICAL THERAPY | Facility: CLINIC | Age: 64
End: 2019-10-03
Payer: COMMERCIAL

## 2019-10-03 DIAGNOSIS — M54.41 CHRONIC BILATERAL LOW BACK PAIN WITH BILATERAL SCIATICA: Primary | ICD-10-CM

## 2019-10-03 DIAGNOSIS — M54.42 CHRONIC BILATERAL LOW BACK PAIN WITH BILATERAL SCIATICA: Primary | ICD-10-CM

## 2019-10-03 DIAGNOSIS — G89.29 CHRONIC BILATERAL LOW BACK PAIN WITH BILATERAL SCIATICA: Primary | ICD-10-CM

## 2019-10-03 PROCEDURE — 97140 MANUAL THERAPY 1/> REGIONS: CPT | Mod: GP

## 2019-10-03 PROCEDURE — 97110 THERAPEUTIC EXERCISES: CPT | Mod: GP

## 2019-10-09 ENCOUNTER — THERAPY VISIT (OUTPATIENT)
Dept: PHYSICAL THERAPY | Facility: CLINIC | Age: 64
End: 2019-10-09
Payer: COMMERCIAL

## 2019-10-09 DIAGNOSIS — M54.41 CHRONIC BILATERAL LOW BACK PAIN WITH BILATERAL SCIATICA: Primary | ICD-10-CM

## 2019-10-09 DIAGNOSIS — M54.42 CHRONIC BILATERAL LOW BACK PAIN WITH BILATERAL SCIATICA: Primary | ICD-10-CM

## 2019-10-09 DIAGNOSIS — G89.29 CHRONIC BILATERAL LOW BACK PAIN WITH BILATERAL SCIATICA: Primary | ICD-10-CM

## 2019-10-09 PROCEDURE — 97140 MANUAL THERAPY 1/> REGIONS: CPT | Mod: GP

## 2019-10-09 PROCEDURE — 97035 APP MDLTY 1+ULTRASOUND EA 15: CPT | Mod: GP

## 2019-10-09 PROCEDURE — 97110 THERAPEUTIC EXERCISES: CPT | Mod: GP

## 2019-10-09 PROCEDURE — 97112 NEUROMUSCULAR REEDUCATION: CPT | Mod: GP

## 2023-03-13 ENCOUNTER — PATIENT OUTREACH (OUTPATIENT)
Dept: GERIATRIC MEDICINE | Facility: CLINIC | Age: 68
End: 2023-03-13

## 2023-03-13 NOTE — PROGRESS NOTES
[facility] Care Coordination Contact    Member became effective with  Sam on 03/01/2023 with [facility] MSC+.  Previous Health Plan: MA/Fee For Service  Previous Care System: [facility]  Previous care coordinators name and number: NA  Waiver Type: N/A  Last MMIS Entry: Date NA and Type NA  MMIS visit date (and type) if different from above: NA  Services Listed in MMIS:   UTF received: No UTF to request  Address/Phone discrepancy: JOSE Tony  Care Management Specialist  [facility]  [phone]

## 2023-03-13 NOTE — LETTER
March 13, 2023    MAHAD MILLIGAN  [address]  [place] [address]    Dear  Mahad,    Welcome to [facility] s MSC+ health program. My name is Myriam Lomeli RN, PHN. I am your MSC+ care coordinator. You are eligible for Care Coordination through [facility] MSC+ plan.    As your care coordinator, we ll:    Meet to go over your care coordination benefits    Talk about your physical and mental health care needs     Review your preventative care needs    Create a plan that meets your needs with the services you choose    What happens next?  I ll call you soon to introduce myself and tell you more about my role. We ll then plan time to go over your health and safety needs. Our goal is to keep you as healthy and independent as possible.    Soon, you will receive a new MSC+ member identification (ID) card from [facility]. When you receive it, please use this card along with your [place] [facility] card and Prescription Drug Coverage Program card. When you receive, it please use this card where you get your health services. If you have Medicare, you will need to show your Medicare card when you get health services.    The MSC+ care coordination program is voluntary and offered to you at no cost. If you wish to stop being in the care coordination program or have questions, call me at [phone]. If you reach my voicemail, leave a message and your phone number. TTY users, call the [place] [employer] at 650 or [phone] (ubylmm-ec-yskjyt relay service).    Sincerely,      Myiram Lomeli RN, PHN    E-mail:[email or web address]  Phone: [phone]    Care Manager  [facility]    [id number] accepted   [id number]       [id number] (07/2022)

## 2023-03-17 ENCOUNTER — PATIENT OUTREACH (OUTPATIENT)
Dept: GERIATRIC MEDICINE | Facility: CLINIC | Age: 68
End: 2023-03-17

## 2023-03-17 NOTE — PROGRESS NOTES
"[facility] Care Coordination Contact    Per CC, mailed client an \"Unable to Contact\" letter.    Laura Tony  Care Management Specialist  [facility]  [phone]      "

## 2023-03-17 NOTE — LETTER
March 17, 2023    MAHAD MILLIGAN  [address]  [place] [address]    Dear Mahad:     I m your care coordinator. I ve been unable to reach you by phone. I am writing to ask you or your authorized representative to call me at [phone]. If you reach my voicemail, leave a message with your daytime phone number. Include a date and time that I can call you. If you are hearing impaired, call the [place] [employer] at 260 or [phone] (nxqbut-ck-sjzvdc relay service).    The reason I am trying to reach you is:     [] To schedule an assessment  [] For your six (6)-month check-in  [x] Other: Introduction & Initial Assessment    Please call me as soon as you receive this letter. I look forward to speaking with you.    Sincerely,    Myriam Lomeli RN, PHN    E-mail:[email or web address]  Phone: [phone]    Care Manager  [facility]        [id number] accepted  [id number]                                                                        B  (08/2022)

## 2023-03-22 ENCOUNTER — PATIENT OUTREACH (OUTPATIENT)
Dept: GERIATRIC MEDICINE | Facility: CLINIC | Age: 68
End: 2023-03-22

## 2023-03-22 NOTE — PROGRESS NOTES
[facility] Care Coordination Contact    Completed 4 attempts to reach client with no response.  Member is officially unable to contact effective today.  Completed MMIS entry.  Completed health plan required [facility] POC.    Follow-up Plan: CC will attempt to reach member in six months.    No assigned PCP to member. Last medical appointment was over 3 years ago.     Myriam Lomeli RN, PHN  [facility]  [phone]

## 2023-09-05 ENCOUNTER — PATIENT OUTREACH (OUTPATIENT)
Dept: GERIATRIC MEDICINE | Facility: CLINIC | Age: 68
End: 2023-09-05
Payer: MEDICAID

## 2023-09-06 NOTE — PROGRESS NOTES
[facility] Care Coordination Contact      [facility] Six-Month Telephone Assessment    6 month telephone assessment completed on 9/5/2023.    ER visits: No  Hospitalizations: No  TCU stays: No  Significant health status changes: None reported  Falls/Injuries: No  ADL/IADL changes: No  Changes in services: No    Caregiver Assessment follow up:  N/A    Goals: See POC in chart for goal progress documentation.  Member states she is doing well and has no health concerns. Member is visiting her daughter out-of-state. Reviewed Care Coordinator's role with member. Mahad verbalized understanding. Mahad states when she returns to [place] she will contact Care Coordinator. No returned date determined at this time.     Will see member in 6 months for an annual health risk assessment.   Encouraged member to call CC with any questions or concerns in the meantime.     Myriam Lomeli RN, PHN  [facility]  [phone]

## 2024-02-12 ENCOUNTER — PATIENT OUTREACH (OUTPATIENT)
Dept: GERIATRIC MEDICINE | Facility: CLINIC | Age: 69
End: 2024-02-12
Payer: COMMERCIAL

## 2024-02-12 NOTE — LETTER
March 5, 2024    MAHAD MILLIGAN  [address]  [place] [address]        Dear Mahad:    As a member of [facility]'s MSC+ program, we offer a health risk assessment at no cost to you. I know you don't want to have the assessment right now. If you change your mind, please call me at the number below.    Who performs the health risk assessment?  A [facility] Care Coordinator performs the assessment. Our Care Coordinators can also help you understand your benefits. They can tell you about services to aid you at home, such as managing your care with your doctors if your health worsens.    Our Care Coordinators are here for you if you need:  Support for activities you used to do by yourself (including making meals, bathing and paying bills)  Equipment for bathroom or home safety  Help finding a new place to live  Information on staying healthy, preventing falls and immunizations    Questions?  If you have questions, or you would like to do he assessment, call me at [phone]. TTY users call [phone]. I'm here from 8am to 5pm. I may reach out to you again soon.       Sincerely,         Myriam Lomeli RN, PHN    E-mail:[email or web address]  Phone: [phone]    Care Manager  [facility]      \<[id number] accepted  [id number]>    [id number] (21/2021)

## 2024-02-12 NOTE — Clinical Note
Hello,  FYI - I'm the Care Coordinator for Mahad Jay. Member declined an annual assessment with me.   Thank you,  Myriam Lomeli RN, PHN [facility] [phone]

## 2024-02-12 NOTE — PROGRESS NOTES
[facility] Care Coordination Contact      [facility] Refusal Telephone Assessment    Member refused home visit HRA on 2/12/2024 (reason: Mahad's daughter states member is out-of-state in [place] with her other daughter. Per daughter, Hardy, member is refusing a visit at this time. Hardy will have member contact Care Coordinator to schedule a visit when she returns home ).    ER visits: No  Hospitalizations: No  Health concerns: None reported.   Falls/Injuries: No  ADL/IADL Dependencies: None reported        Member currently receiving the following home care services:   N/A  Member currently receiving the following community resources:  None reported  Informal support(s):  None reported    Advanced Care Planning discussion, complete code section.    [facility] sponsored benefits: Shared information re: Silver Sneakers/gym memberships, ASA, Calcium +D.    Follow-Up Plan: Member informed of future contact, plan to f/u with member with a 6 month telephone assessment and offer a home visit.  Contact information shared with member and family, encouraged member to call with any questions or concerns at any time.    This CC note routed to PCP, [facility] - [facility].    Myriam Lomeli RN, PHN  [facility]  [phone]

## 2024-02-12 NOTE — Clinical Note
Hello,  This member is assigned to [facility]. This is a FYI. I'm the care coordinator for Mahad Jay and offered her an annual assessment today. Member's daughter, Hardy states member is refusing a visit as she is out-of-state.   Thank you,  Myriam Lomeli RN, [facility] [phone]

## 2024-03-05 NOTE — PROGRESS NOTES
"[facility] Care Coordination Contact    Per CC, mailed client a \"Refusal of Home Visit\" letter.    Laura Tony  Care Management Specialist  [facility]  [phone]        "

## 2024-05-06 ENCOUNTER — PATIENT OUTREACH (OUTPATIENT)
Dept: GERIATRIC MEDICINE | Facility: CLINIC | Age: 69
End: 2024-05-06
Payer: COMMERCIAL

## 2024-05-13 ENCOUNTER — OFFICE VISIT (OUTPATIENT)
Dept: FAMILY MEDICINE | Facility: CLINIC | Age: 69
End: 2024-05-13
Payer: COMMERCIAL

## 2024-05-13 VITALS
RESPIRATION RATE: 20 BRPM | HEIGHT: 59 IN | SYSTOLIC BLOOD PRESSURE: 116 MMHG | HEART RATE: 76 BPM | WEIGHT: 125 LBS | OXYGEN SATURATION: 98 % | BODY MASS INDEX: 25.2 KG/M2 | TEMPERATURE: 97.8 F | DIASTOLIC BLOOD PRESSURE: 78 MMHG

## 2024-05-13 DIAGNOSIS — Z12.31 VISIT FOR SCREENING MAMMOGRAM: ICD-10-CM

## 2024-05-13 DIAGNOSIS — Z12.11 SCREEN FOR COLON CANCER: ICD-10-CM

## 2024-05-13 DIAGNOSIS — Z13.820 SCREENING FOR OSTEOPOROSIS: ICD-10-CM

## 2024-05-13 DIAGNOSIS — Z00.00 ROUTINE GENERAL MEDICAL EXAMINATION AT A HEALTH CARE FACILITY: Primary | ICD-10-CM

## 2024-05-13 DIAGNOSIS — R10.13 EPIGASTRIC PAIN: ICD-10-CM

## 2024-05-13 DIAGNOSIS — Z13.1 SCREENING FOR DIABETES MELLITUS: ICD-10-CM

## 2024-05-13 DIAGNOSIS — Z13.6 CARDIOVASCULAR SCREENING; LDL GOAL LESS THAN 160: ICD-10-CM

## 2024-05-13 DIAGNOSIS — G47.9 SLEEP DISTURBANCE: ICD-10-CM

## 2024-05-13 LAB — HBA1C MFR BLD: 5.3 % (ref 0–5.6)

## 2024-05-13 PROCEDURE — 99397 PER PM REEVAL EST PAT 65+ YR: CPT | Performed by: PHYSICIAN ASSISTANT

## 2024-05-13 PROCEDURE — 36415 COLL VENOUS BLD VENIPUNCTURE: CPT | Performed by: PHYSICIAN ASSISTANT

## 2024-05-13 PROCEDURE — 83036 HEMOGLOBIN GLYCOSYLATED A1C: CPT | Performed by: PHYSICIAN ASSISTANT

## 2024-05-13 PROCEDURE — 99213 OFFICE O/P EST LOW 20 MIN: CPT | Mod: 25 | Performed by: PHYSICIAN ASSISTANT

## 2024-05-13 PROCEDURE — 80061 LIPID PANEL: CPT | Performed by: PHYSICIAN ASSISTANT

## 2024-05-13 SDOH — HEALTH STABILITY: PHYSICAL HEALTH: ON AVERAGE, HOW MANY MINUTES DO YOU ENGAGE IN EXERCISE AT THIS LEVEL?: 30 MIN

## 2024-05-13 SDOH — HEALTH STABILITY: PHYSICAL HEALTH: ON AVERAGE, HOW MANY DAYS PER WEEK DO YOU ENGAGE IN MODERATE TO STRENUOUS EXERCISE (LIKE A BRISK WALK)?: 7 DAYS

## 2024-05-13 ASSESSMENT — SOCIAL DETERMINANTS OF HEALTH (SDOH): HOW OFTEN DO YOU GET TOGETHER WITH FRIENDS OR RELATIVES?: THREE TIMES A WEEK

## 2024-05-13 NOTE — PATIENT INSTRUCTIONS
"She can get Tetanus, Shingles, and RSV vaccine    Preventive Care Advice   This is general advice we often give to help people stay healthy. Your care team may have specific advice just for you. Please talk to your care team about your own preventive care needs.  Lifestyle  Exercise at least 150 minutes each week (30 minutes a day, 5 days a week).  Do muscle strengthening activities 2 days a week. These help control your weight and prevent disease.  No smoking.  Wear sunscreen to prevent skin cancer.  Have your home tested for radon every 2 to 5 years. Radon is a colorless, odorless gas that can harm your lungs. To learn more, go to www.health.[facility].mn.us and search for \"Radon in Homes.\"  Keep guns unloaded and locked up in a safe place like a safe or gun vault, or, use a gun lock and hide the keys. Always lock away bullets separately. To learn more, visit [employer].mn.gov and search for \"safe gun storage.\"  Nutrition  Eat 5 or more servings of fruits and vegetables each day.  Try wheat bread, brown rice and whole grain pasta (instead of white bread, rice, and pasta).  Get enough calcium and vitamin D. Check the label on foods and aim for 100% of the RDA (recommended daily allowance).  Regular exams  Have a dental exam and cleaning every 6 months.  See your health care team every year to talk about:  Any changes in your health.  Any medicines your care team has prescribed.  Preventive care, family planning, and ways to prevent chronic diseases.  Shots (vaccines)   HPV shots (up to age 26), if you've never had them before.  Hepatitis B shots (up to age 59), if you've never had them before.  COVID-19 shot: Get this shot when it's due.  Flu shot: Get a flu shot every year.  Tetanus shot: Get a tetanus shot every 10 years.  Pneumococcal, hepatitis A, and RSV shots: Ask your care team if you need these based on your risk.  Shingles shot (for age 50 and up).  General health tests  Diabetes screening:  Starting at age 35, Get " screened for diabetes at least every 3 years.  If you are younger than age 35, ask your care team if you should be screened for diabetes.  Cholesterol test: At age 39, start having a cholesterol test every 5 years, or more often if advised.  Bone density scan (DEXA): At age 50, ask your care team if you should have this scan for osteoporosis (brittle bones).  Hepatitis C: Get tested at least once in your life.  Abdominal aortic aneurysm screening: Talk to your doctor about having this screening if you:  Have ever smoked; and  Are biologically male; and  Are between the ages of 65 and 75.  STIs (sexually transmitted infections)  Before age 24: Ask your care team if you should be screened for STIs.  After age 24: Get screened for STIs if you're at risk. You are at risk for STIs (including HIV) if:  You are sexually active with more than one person.  You don't use condoms every time.  You or a partner was diagnosed with a sexually transmitted infection.  If you are at risk for HIV, ask about PrEP medicine to prevent HIV.  Get tested for HIV at least once in your life, whether you are at risk for HIV or not.  Cancer screening tests  Cervical cancer screening: If you have a cervix, begin getting regular cervical cancer screening tests at age 21. Most people who have regular screenings with normal results can stop after age 65. Talk about this with your provider.  Breast cancer scan (mammogram): If you've ever had breasts, begin having regular mammograms starting at age 40. This is a scan to check for breast cancer.  Colon cancer screening: It is important to start screening for colon cancer at age 45.  Have a colonoscopy test every 10 years (or more often if you're at risk) Or, ask your provider about stool tests like a FIT test every year or Cologuard test every 3 years.  To learn more about your testing options, visit: www.[employer]/296375.pdf.  For help making a decision, visit: jose/jc65588.  Prostate cancer  screening test: If you have a prostate and are age 55 to 69, ask your provider if you would benefit from a yearly prostate cancer screening test.  Lung cancer screening: If you are a current or former smoker age 50 to 80, ask your care team if ongoing lung cancer screenings are right for you.  For informational purposes only. Not to replace the advice of your health care provider. Copyright   2023 [facility]. All rights reserved. Clinically reviewed by the [facility] Transitions Program. [employer]Mark [id number] - REV 04/24.    Learning About Stress  What is stress?     Stress is your body's response to a hard situation. Your body can have a physical, emotional, or mental response. Stress is a fact of life for most people, and it affects everyone differently. What causes stress for you may not be stressful for someone else.  A lot of things can cause stress. You may feel stress when you go on a job interview, take a test, or run a race. This kind of short-term stress is normal and even useful. It can help you if you need to work hard or react quickly. For example, stress can help you finish an important job on time.  Long-term stress is caused by ongoing stressful situations or events. Examples of long-term stress include long-term health problems, ongoing problems at work, or conflicts in your family. Long-term stress can harm your health.  How does stress affect your health?  When you are stressed, your body responds as though you are in danger. It makes hormones that speed up your heart, make you breathe faster, and give you a burst of energy. This is called the fight-or-flight stress response. If the stress is over quickly, your body goes back to normal and no harm is done.  But if stress happens too often or lasts too long, it can have bad effects. Long-term stress can make you more likely to get sick, and it can make symptoms of some diseases worse. If you tense up when you are stressed, you may  develop neck, shoulder, or low back pain. Stress is linked to high blood pressure and heart disease.  Stress also harms your emotional health. It can make you hayden, tense, or depressed. Your relationships may suffer, and you may not do well at work or school.  What can you do to manage stress?  You can try these things to help manage stress:   Do something active. Exercise or activity can help reduce stress. Walking is a great way to get started. Even everyday activities such as housecleaning or yard work can help.  Try yoga or dieter chi. These techniques combine exercise and meditation. You may need some training at first to learn them.  Do something you enjoy. For example, listen to music or go to a movie. Practice your hobby or do volunteer work.  Meditate. This can help you relax, because you are not worrying about what happened before or what may happen in the future.  Do guided imagery. Imagine yourself in any setting that helps you feel calm. You can use online videos, books, or a teacher to guide you.  Do breathing exercises. For example:  From a standing position, bend forward from the waist with your knees slightly bent. Let your arms dangle close to the floor.  Breathe in slowly and deeply as you return to a standing position. Roll up slowly and lift your head last.  Hold your breath for just a few seconds in the standing position.  Breathe out slowly and bend forward from the waist.  Let your feelings out. Talk, laugh, cry, and express anger when you need to. Talking with supportive friends or family, a counselor, or a bright leader about your feelings is a healthy way to relieve stress. Avoid discussing your feelings with people who make you feel worse.  Write. It may help to write about things that are bothering you. This helps you find out how much stress you feel and what is causing it. When you know this, you can find better ways to cope.  What can you do to prevent stress?  You might try some of  "these things to help prevent stress:  Manage your time. This helps you find time to do the things you want and need to do.  Get enough sleep. Your body recovers from the stresses of the day while you are sleeping.  Get support. Your family, friends, and community can make a difference in how you experience stress.  Limit your news feed. Avoid or limit time on social media or news that may make you feel stressed.  Do something active. Exercise or activity can help reduce stress. Walking is a great way to get started.  Where can you learn more?  Go to https://www.[employer].net/patiented  Enter N032 in the search box to learn more about \"Learning About Stress.\"  Current as of: October 24, 2023               Content Version: 14.0    7501-5814 [employer].   Care instructions adapted under license by your healthcare professional. If you have questions about a medical condition or this instruction, always ask your healthcare professional. [employer] disclaims any warranty or liability for your use of this information.      Learning About Sleeping Well  What does sleeping well mean?     Sleeping well means getting enough sleep to feel good and stay healthy. How much sleep is enough varies among people.  The number of hours you sleep and how you feel when you wake up are both important. If you do not feel refreshed, you probably need more sleep. Another sign of not getting enough sleep is feeling tired during the day.  Experts recommend that adults get at least 7 or more hours of sleep per day. Children and older adults need more sleep.  Why is getting enough sleep important?  Getting enough quality sleep is a basic part of good health. When your sleep suffers, your physical health, mood, and your thoughts can suffer too. You may find yourself feeling more grumpy or stressed. Not getting enough sleep also can lead to serious problems, including injury, accidents, anxiety, and depression.  What " "might cause poor sleeping?  Many things can cause sleep problems, including:  Changes to your sleep schedule.  Stress. Stress can be caused by fear about a single event, such as giving a speech. Or you may have ongoing stress, such as worry about work or school.  Depression, anxiety, and other mental or emotional conditions.  Changes in your sleep habits or surroundings. This includes changes that happen where you sleep, such as noise, light, or sleeping in a different bed. It also includes changes in your sleep pattern, such as having jet lag or working a late shift.  Health problems, such as pain, breathing problems, and restless legs syndrome.  Lack of regular exercise.  Using alcohol, nicotine, or caffeine before bed.  How can you help yourself?  Here are some tips that may help you sleep more soundly and wake up feeling more refreshed.  Your sleeping area   Use your bedroom only for sleeping and sex. A bit of light reading may help you fall asleep. But if it doesn't, do your reading elsewhere in the house. Try not to use your TV, computer, smartphone, or tablet while you are in bed.  Be sure your bed is big enough to stretch out comfortably, especially if you have a sleep partner.  Keep your bedroom quiet, dark, and cool. Use curtains, blinds, or a sleep mask to block out light. To block out noise, use earplugs, soothing music, or a \"white noise\" machine.  Your evening and bedtime routine   Create a relaxing bedtime routine. You might want to take a warm shower or bath, or listen to soothing music.  Go to bed at the same time every night. And get up at the same time every morning, even if you feel tired.  What to avoid   Limit caffeine (coffee, tea, caffeinated sodas) during the day, and don't have any for at least 6 hours before bedtime.  Avoid drinking alcohol before bedtime. Alcohol can cause you to wake up more often during the night.  Try not to smoke or use tobacco, especially in the evening. Nicotine can " "keep you awake.  Limit naps during the day, especially close to bedtime.  Avoid lying in bed awake for too long. If you can't fall asleep or if you wake up in the middle of the night and can't get back to sleep within about 20 minutes, get out of bed and go to another room until you feel sleepy.  Avoid taking medicine right before bed that may keep you awake or make you feel hyper or energized. Your doctor can tell you if your medicine may do this and if you can take it earlier in the day.  If you can't sleep   Imagine yourself in a peaceful, pleasant scene. Focus on the details and feelings of being in a place that is relaxing.  Get up and do a quiet or boring activity until you feel sleepy.  Avoid drinking any liquids before going to bed to help prevent waking up often to use the bathroom.  Where can you learn more?  Go to https://www.[employer].org.net/patiented  Enter J942 in the search box to learn more about \"Learning About Sleeping Well.\"  Current as of: July 10, 2023               Content Version: 14.0    4396-1180 [employer].   Care instructions adapted under license by your healthcare professional. If you have questions about a medical condition or this instruction, always ask your healthcare professional. [employer] disclaims any warranty or liability for your use of this information.      "

## 2024-05-13 NOTE — PROGRESS NOTES
"Preventive Care Visit  [facility]  Neda Damon PA-C, Physician Assistant - Medical  May 13, 2024      Assessment & Plan     Routine general medical examination at a health care facility  Discussed the vaccine she is due for.  Does not look like Medicare in the chart however patient and daughter think she has Medicare, will hold off on vaccines and can check with pharmacy.  Did write out vaccines that she is due for.    Epigastric pain  Suspect GERD, lower suspicion for gallbladder.  Will try short course of omeprazole as she has not had treatment in the past and may not need indefinite treatment.  Discussed if symptoms are returning may need more frequent.  If not having improvement could consider ultrasound.  - omeprazole (PRILOSEC) 20 MG DR capsule; Take 1 capsule (20 mg) by mouth daily for 14 days    Sleep disturbance  Discussed behavior modifications.  Limit fluids few hours prior to bedtime.  If not having improvement could consider sleep evaluation.    Visit for screening mammogram  Will be going to [place] for several months, will do later in the year.  - MA SCREENING DIGITAL BILAT - Future  (s+30); Future    Screen for colon cancer  Will be going to [place] for several months, will do later in the year.  - Colonoscopy Screening  Referral; Future    CARDIOVASCULAR SCREENING; LDL GOAL LESS THAN 160  - Lipid panel reflex to direct LDL Non-fasting; Future  - Lipid panel reflex to direct LDL Non-fasting    Screening for diabetes mellitus  - Hemoglobin A1c; Future  - Hemoglobin A1c    Screening for osteoporosis  Will be going to [place] for several months, will do later in the year.  - DEXA HIP/PELVIS/SPINE - Future; Future              BMI  Estimated body mass index is 25.54 kg/m  as calculated from the following:    Height as of this encounter: 1.49 m (4' 10.66\").    Weight as of this encounter: 56.7 kg (125 lb).       Counseling  Appropriate preventive services were " discussed with this patient, including applicable screening as appropriate for fall prevention, nutrition, physical activity, Tobacco-use cessation, weight loss and cognition.  Checklist reviewing preventive services available has been given to the patient.  Reviewed patient's diet, addressing concerns and/or questions.   The patient was instructed to see the dentist every 6 months.   Discussed possible causes of fatigue.         Wiley Tobin is a 69 year old, presenting for the following:  Physical        5/13/2024     3:24 PM   Additional Questions   Roomed by Pooja   Accompanied by daughter   Reynaldo phone       5/13/2024     3:24 PM   Patient Reported Additional Medications   Patient reports taking the following new medications none        Health Care Directive  Patient does not have a Health Care Directive or Living Will: Discussed advance care planning with patient; information given to patient to review.    HPI      Patient states for some time she has had pain to her upper stomach that radiates down to her lower stomach if she eats certain food.  Unsure what food triggers it but it is not always present.  If she gets it, she will take Tums which is helpful but sometimes takes an hour.  She knows almost immediately if the food is going to cause symptoms.  No vomiting.  No past abdominal surgeries.    Also notes that she has been having issues with sleep.  She will go to bed typically around 9 PM.  No issues falling asleep.  She will feel like she has slept for some time but then she will look at the clock and it will need be 11 PM or midnight.  She will then have difficulty falling asleep for about 4 hours.  When she wakes up she has to go to the bathroom.  She will urinate 4-5 times.  Feels like she drinks a lot of water especially at bedtime.  When she is awake she will listen to audio recording of the Bible.            5/13/2024   General Health   How would you rate your overall  physical health? Good   Feel stress (tense, anxious, or unable to sleep) Rather much   (!) STRESS CONCERN      5/13/2024   Nutrition   Diet: I don't know         5/13/2024   Exercise   Days per week of moderate/strenous exercise 7 days   Average minutes spent exercising at this level 30 min         5/13/2024   Social Factors   Frequency of gathering with friends or relatives Three times a week   Worry food won't last until get money to buy more No   Food not last or not have enough money for food? No   Do you have housing?  No   Are you worried about losing your housing? No   Lack of transportation? No   Unable to get utilities (heat,electricity)? No   Want help with housing or utility concern? No   (!) HOUSING CONCERN PRESENT      5/13/2024   Fall Risk   Fallen 2 or more times in the past year? No   Trouble with walking or balance? No          5/13/2024   Activities of Daily Living- Home Safety   Needs help with the following daily activites None of the above   Safety concerns in the home None of the above         5/13/2024   Dental   Dentist two times every year? (!) NO         5/13/2024   Hearing Screening   Hearing concerns? None of the above         5/13/2024   Driving Risk Screening   Patient/family members have concerns about driving No         5/13/2024   General Alertness/Fatigue Screening   Have you been more tired than usual lately? (!) YES         5/13/2024   Urinary Incontinence Screening   Bothered by leaking urine in past 6 months No         5/13/2024   TB Screening   Were you born outside of the [place]? Yes         Today's PHQ-2 Score:       5/13/2024     3:44 PM   PHQ-2 ( 1999 Pfizer)   Q1: Little interest or pleasure in doing things 0   Q2: Feeling down, depressed or hopeless 0   PHQ-2 Score 0    0   Q1: Little interest or pleasure in doing things Not at all   Q2: Feeling down, depressed or hopeless Not at all   PHQ-2 Score 0           5/13/2024   Substance Use   Alcohol more than 3/day or more than  7/wk No   Do you have a current opioid prescription? No   How severe/bad is pain from 1 to 10? 0/10 (No Pain)   Do you use any other substances recreationally? No     Social History     Tobacco Use    Smoking status: Never     Passive exposure: Never    Smokeless tobacco: Never   Vaping Use    Vaping status: Never Used   Substance Use Topics    Alcohol use: No    Drug use: No          Mammogram Screening - Mammogram every 1-2 years updated in Health Maintenance based on mutual decision making    ASCVD Risk   The ASCVD Risk score (Jose Elias BARAHONA, et al., 2019) failed to calculate for the following reasons:    Cannot find a previous HDL lab    Cannot find a previous total cholesterol lab    Fracture Risk Assessment Tool  Link to Frax Calculator  Use the information below to complete the Frax calculator  : 1955  Sex: female  Weight (kg): 56.7 kg (actual weight)  Height (cm): 149 cm  Previous Fragility Fracture:  No  History of parent with fractured hip:  No  Current Smoking:  No  Patient has been on glucocorticoids for more than 3 months (5mg/day or more): No  Rheumatoid Arthritis on Problem List:  No  Secondary Osteoporosis on Problem List:  No  Consumes 3 or more units of alcohol per day: No  Femoral Neck BMD (g/cm2)            Reviewed and updated as needed this visit by Provider                      Current providers sharing in care for this patient include:  Patient Care Team:  Clinic - [facility] as PCP - Myriam Zuñiga RN as Lead Care Coordinator (Primary Care - [facility])    The following health maintenance items are reviewed in Epic and correct as of today:  Health Maintenance   Topic Date Due    DEXA  Never done    ANNUAL REVIEW OF HM ORDERS  Never done    ADVANCE CARE PLANNING  Never done    LIPID  Never done    ZOSTER IMMUNIZATION (1 of 2) Never done    RSV VACCINE (Pregnancy & 60+) (1 - 1-dose 60+ series) Never done    EYE EXAM  10/03/2018    GLUCOSE  2020     "DTAP/TDAP/TD IMMUNIZATION (3 - Tdap) 02/17/2021    Pneumococcal Vaccine: 65+ Years (1 of 1 - PCV) Never done    MAMMO SCREENING  08/23/2021    COLORECTAL CANCER SCREENING  06/01/2023    COVID-19 Vaccine (1 - 2023-24 season) Never done    INFLUENZA VACCINE (Season Ended) 09/01/2024    MEDICARE ANNUAL WELLNESS VISIT  05/13/2025    FALL RISK ASSESSMENT  05/13/2025    HEPATITIS C SCREENING  Completed    PHQ-2 (once per calendar year)  Completed    IPV IMMUNIZATION  Aged Out    HPV IMMUNIZATION  Aged Out    MENINGITIS IMMUNIZATION  Aged Out    RSV MONOCLONAL ANTIBODY  Aged Out    PAP  Discontinued            Objective    Exam  /78 (BP Location: Left arm, Patient Position: Sitting, Cuff Size: Adult Regular)   Pulse 76   Temp 97.8  F (36.6  C) (Oral)   Resp 20   Ht 1.49 m (4' 10.66\")   Wt 56.7 kg (125 lb)   SpO2 98%   BMI 25.54 kg/m     Estimated body mass index is 25.54 kg/m  as calculated from the following:    Height as of this encounter: 1.49 m (4' 10.66\").    Weight as of this encounter: 56.7 kg (125 lb).    Physical Exam  GENERAL: alert and no distress  EYES: Eyes grossly normal to inspection, PERRL and conjunctivae and sclerae normal  HENT: ear canals and TM's normal, nose and mouth without ulcers or lesions  NECK: no adenopathy, no asymmetry, masses, or scars  RESP: lungs clear to auscultation - no rales, rhonchi or wheezes  CV: regular rate and rhythm, normal S1 S2, no S3 or S4, no murmur, click or rub, no peripheral edema  ABDOMEN: soft, nontender, no hepatosplenomegaly, no masses and bowel sounds normal  MS: no gross musculoskeletal defects noted, no edema  SKIN: no suspicious lesions or rashes  NEURO: Normal strength and tone, mentation intact and speech normal  PSYCH: mentation appears normal, affect normal/bright        5/13/2024   Mini Cog   Clock Draw Score 0 Abnormal   3 Item Recall 3 objects recalled   Mini Cog Total Score 3      Note non English speaking, clock slightly off.        Signed " Electronically by: Neda Damon PA-C

## 2024-05-13 NOTE — COMMUNITY RESOURCES LIST (ENGLISH)
May 13, 2024           YOUR PERSONALIZED LIST OF SERVICES & PROGRAMS           & SHELTER    Housing      Serving People - Shelter for families  [address] [place] [address] (Distance: 12.1 miles)  Phone: [phone]  Language: English  Fee: Free      [facility] ([facility]) - Adult [facility]ter [facility] ([facility])  [address] [place] [address] (Distance: 11.5 miles)  Phone: [phone]) [phone]  Website: https://www.[facility].org/our-programs/adult-shelter-connect-[place]-shelter/  Language: English, Kazakh  Fee: Free      [facility] - [facility]  Phone: [phone]  Website: https://www.Cortona3D/  Language: English, Hmong, Oromo, Martiniquais, Kazakh  Hours: Mon 9:00 AM - 5:00 PM Tue 9:00 AM - 5:00 PM Wed 9:00 AM - 5:00 PM Thu 9:00 AM - 5:00 PM Fri 9:00 AM - 5:00 PM  Fee: Insurance  Accessibility: Blind accommodation, Deaf or hard of hearing, Translation services  Transportation Options: Free transportation    Case Management      [facility] - Housing With Services Independent  [address] [place] [address] (Distance: 9.6 miles)  Phone: [phone]  Website: https://www.InspireMD.BlogCN/contact  Language: English, Kazakh  Accessibility: Ada accessible, Blind accommodation, Deaf or hard of hearing, Translation services      Living - Housing Support[facility] (HWS-I)  [address] [place] [address] (Distance: 12.7 miles)  Phone: [phone]) [phone]  Website: https://www.etechies.in  Language: Malay, English, Martiniquais  Fee: Insurance      [facility] - Housing Stabilization Services  Phone: [phone]) [phone]  Website: https://homebasemn.com/  Language: English  Hours: Mon 8:00 AM - 4:00 PM Tue 8:00 AM - 4:00 PM Wed 8:00 AM - 4:00 PM Thu 8:00 AM - 4:00 PM Fri 8:00 AM - 4:00 PM  Fee: Free  Accessibility: Blind accommodation, Deaf or hard of hearing  Transportation Options: Free transportation    Drop-In  Services      [facility] - Drop-in center or day shelter  [address] [place] [address] (Distance: 10.5 miles)  Phone: [phone]  Language: English  Fee: Free      [facility] - Drop-in center or day shelter  [address] [place] [address] (Distance: 13.4 miles)  Phone: [phone]  Language: English  Fee: Free  Accessibility: Ada accessible, Translation services      [facility]OVE [facility] LOVE  Website: http://www.laundrylove.org               IMPORTANT NUMBERS & WEBSITES        Emergency Services  911  .   United Way  211 http://211unitedway.org  .   Poison Control  (524) 785-4765 http://[place]poison.org http://wis[place]poison.org  .     Suicide and Crisis Lifeline  988 http://988[facility] Health and Safetyline.org  .   Childhelp [place] Child Abuse Hotline  554.623.9068 http://Childhelphotline.org   .   National Sexual Assault Hotline  (562) 649-9703 (HOPE) http://Tillern.org   .     National Runaway Safeline  (878) 830-5598 (RUNAWAY) http://Openovate LabsruWhitewood [employer].Ideabove  .   Pregnancy & Postpartum Support  Call/text 225-689-3153  [place]: http://ppsupportmn.org  [place]: http://NetTalon.com/wi  .   Substance Abuse National Helpline ([facility])  142-611-HELP (1095) http://Findtreatment.gov   .                DISCLAIMER: These resources have been generated via the [employer] Platform. [employer] does not endorse any service providers mentioned in this resource list. [employer] does not guarantee that the services mentioned in this resource list will be available to you or will improve your health or wellness.    [facility]

## 2024-05-14 ENCOUNTER — PATIENT OUTREACH (OUTPATIENT)
Dept: GERIATRIC MEDICINE | Facility: CLINIC | Age: 69
End: 2024-05-14
Payer: COMMERCIAL

## 2024-05-14 ENCOUNTER — TELEPHONE (OUTPATIENT)
Dept: FAMILY MEDICINE | Facility: CLINIC | Age: 69
End: 2024-05-14
Payer: COMMERCIAL

## 2024-05-14 LAB
CHOLEST SERPL-MCNC: 169 MG/DL
FASTING STATUS PATIENT QL REPORTED: NO
HDLC SERPL-MCNC: 40 MG/DL
LDLC SERPL CALC-MCNC: 91 MG/DL
NONHDLC SERPL-MCNC: 129 MG/DL
TRIGL SERPL-MCNC: 188 MG/DL

## 2024-05-14 ASSESSMENT — ACTIVITIES OF DAILY LIVING (ADL): DEPENDENT_IADLS:: TRANSPORTATION

## 2024-05-14 ASSESSMENT — LIFESTYLE VARIABLES
AUDIT-C TOTAL SCORE: 0
SKIP TO QUESTIONS 9-10: 1

## 2024-05-14 NOTE — Clinical Note
ALESHA Baer - I'm the care coordinator for patient, Mahad Jay. I completed an annual assessment with your patient today (5/17/24). Mahad states her health is stable and reports independent with most IADLS/ADLS.   Thank you,  Myriam Lomeli RN, N [facility] [phone]

## 2024-05-14 NOTE — TELEPHONE ENCOUNTER
"This writer attempted to contact patient via [employer]  on 05/14/24      Reason for call results and left message.      If patient calls back:   Route to RN line, relay provider message below:    ----- Message from Neda Damon PA-C sent at 5/14/2024 10:14 AM CDT -----    Please call patient with lab results. Her diabetes screen was negative. Her LDL \"bad\" cholesterol looked good. Her HDL \"good\" cholesterol was a bit low, I would recommend regular exercise and plenty of fruits/vegetables to help increase this.    ___________________________________    Patti Branch RN, BSN  [facility]  "

## 2024-05-14 NOTE — PROGRESS NOTES
[facility] Care Coordination Contact    [facility] Home Visit Assessment     Home visit for Health Risk Assessment with Mahad Jay completed on May 14, 2024    Type of residence:: Private home - stairs  Current living arrangement:: I live in a private home with family     Assessment completed with:: Patient, Care Team Member    Current Care Plan  Member currently receiving the following home care services:     Member currently receiving the following community resources: None      Medication Review  Medication reconciliation completed in Epic: If no, please explain Member is not taking any medications at this time.   Medication set-up & administration: NA  Medication Risk Assessment Medication (1 or more, place referral to MTM): N/A: No risk factors identified  MTM Referral Placed: No: No risk factors idenified    Mental/Behavioral Health   Depression Screening:   PHQ-2 Total Score (Adult) - Positive if 3 or more points; Administer PHQ-9 if positive: 0                Falls Assessment:   Fallen 2 or more times in the past year?: No   Any fall with injury in the past year?: No    ADL/IADL Dependencies:   Dependent ADLs:: Independent  Dependent IADLs:: Transportation    Health Plan sponsored benefits: [employer]: Shared information regarding preventative health screening and health plan supplemental benefits/incentives. Reviewed medication disposal form.    PCA Assessment completed at visit: Not Applicable     Elderly Waiver Eligibility: No-does not meet criteria    Care Plan & Recommendations: Annual assessment completed with Mahad Jay at her home. Mahad is 69 years old,  and has 5 children. 2 children lives in [facility], 2 in [place] and 1 in [place]. Mahad lives with her daughter in [place] and spends several months a year in [place] with her youngest daughter. Mahad has health history of Tinnitus, Back Pain and GERD. Mahad states her health is good and managed with exercise, diet and  clinic visits. Mahad is independent with most IADLS and all ADLS. No falls, ED or hospitalization within the past year. Mahad is not receiving any formal support at this time. Care Coordinator educated member on CC's role. Encourage Mahad to contact CC with any questions or changes to needs.    See [facility] for detailed assessment information.    Follow-Up Plan: Member informed of future contact, plan to f/u with member with a 6 month telephone assessment.  Contact information shared with member and family, encouraged member to call with any questions or concerns at any time.    [place] care continuum providers: Please see Snapshot and Care Management Flowsheets for Specific details of care plan.    This CC note routed to PCP - Neda Damon PA-C, Clinic - [facility].      Myriam Lomeli RN, PHN  [facility]  [phone]

## 2024-05-15 ENCOUNTER — APPOINTMENT (OUTPATIENT)
Dept: INTERPRETER SERVICES | Facility: CLINIC | Age: 69
End: 2024-05-15
Payer: COMMERCIAL

## 2024-05-15 ENCOUNTER — PATIENT OUTREACH (OUTPATIENT)
Dept: GERIATRIC MEDICINE | Facility: CLINIC | Age: 69
End: 2024-05-15
Payer: COMMERCIAL

## 2024-05-15 NOTE — TELEPHONE ENCOUNTER
This writer attempted to contact patient via [employer]  on 05/15/24      Reason for call results and left message.      If patient calls back:   Relay message below, (read verbatim), document that pt called and close encounter        Jenni De La Cruz RN

## 2024-05-15 NOTE — PROGRESS NOTES
[facility] Care Coordination Contact    Received after visit chart from care coordinator.  Completed following tasks: Mailed copy of care plan/support plan to member, Mailed [place] Choices signature sheet pages 3-4, Mailed Safe Medication Disposal , and Uploaded consent to communicate form(s) to [facility]    Laura Tony  Care Management Specialist  [facility]  [phone]

## 2024-05-15 NOTE — LETTER
May 15, 2024    MAHAD MILLIGAN  [address]  [place] [address]        Dear Mahad:    At [facility], we re dedicated to improving your health and wellness. Enclosed is the Care Plan developed with you on 5/14/2024. Please review the Care Plan carefully.    As a reminder, during your visit we talked about:  Ways to manage your physical and mental health  Using health care to maintain and improve your health   Your preventive care needs     Remember to contact your care coordinator if you:  Are hospitalized, or plan to be hospitalized   Have a fall    Have a change in your physical or mental health  Need help finding support or services    If you have questions, or don t agree with your Care Plan, call me at [phone]. You can also call me if your needs change. TTY users, call the [place] Relay at (758) or 1-149.368.7901 (hkmmgp-fa-msbsbe relay service).    Sincerely,        Myriam Lomeli RN, PHN    E-mail:[email or web address]  Phone: [phone]    Care Manager  [facility]    [id number] accepted    [id number] (07/2022)

## 2024-05-16 NOTE — TELEPHONE ENCOUNTER
This writer attempted to contact patient via [employer]  on 05/16/24        Reason for call results and left message.        If patient calls back:              Relay message below, (read verbatim), document that pt called and close encounter      Milvia Babin RN

## 2024-05-17 NOTE — TELEPHONE ENCOUNTER
Called daughter via [facility] (CTC on file, does speak English and declined ) and relayed provider result message below. Daughter verbalized understanding. No further questions or concerns. Will close encounter.      Patti Branch RN, BSN  [facility]

## 2024-07-07 ENCOUNTER — HEALTH MAINTENANCE LETTER (OUTPATIENT)
Age: 69
End: 2024-07-07

## 2024-08-29 ENCOUNTER — PATIENT OUTREACH (OUTPATIENT)
Dept: GERIATRIC MEDICINE | Facility: CLINIC | Age: 69
End: 2024-08-29
Payer: COMMERCIAL

## 2024-08-29 NOTE — PROGRESS NOTES
[facility] Care Coordination Contact    Called member and  via  left a voice mail message to remind member to schedule Colonoscopy and Mammogram exam(s).    Left contact info.       YINKA Queen  [facility]  [phone]

## 2024-12-19 ENCOUNTER — PATIENT OUTREACH (OUTPATIENT)
Dept: GERIATRIC MEDICINE | Facility: CLINIC | Age: 69
End: 2024-12-19
Payer: COMMERCIAL

## 2024-12-26 ENCOUNTER — PATIENT OUTREACH (OUTPATIENT)
Dept: CARE COORDINATION | Facility: CLINIC | Age: 69
End: 2024-12-26
Payer: COMMERCIAL

## 2025-02-18 ENCOUNTER — PATIENT OUTREACH (OUTPATIENT)
Dept: GERIATRIC MEDICINE | Facility: CLINIC | Age: 70
End: 2025-02-18
Payer: COMMERCIAL

## 2025-02-18 NOTE — PROGRESS NOTES
[facility] Care Coordination Contact    No longer active with [facility] community case management effective 10/31/2024. 90 day sravani period ended 1/31/25.   Reason for community disenrollment: WESLEY Lomeli RN, PHN  [facility]  [phone]

## 2025-04-14 ENCOUNTER — PATIENT OUTREACH (OUTPATIENT)
Dept: CARE COORDINATION | Facility: CLINIC | Age: 70
End: 2025-04-14
Payer: COMMERCIAL

## 2025-04-28 ENCOUNTER — PATIENT OUTREACH (OUTPATIENT)
Dept: CARE COORDINATION | Facility: CLINIC | Age: 70
End: 2025-04-28
Payer: COMMERCIAL

## 2025-06-22 ENCOUNTER — HEALTH MAINTENANCE LETTER (OUTPATIENT)
Age: 70
End: 2025-06-22